# Patient Record
Sex: FEMALE | Race: WHITE | NOT HISPANIC OR LATINO | URBAN - METROPOLITAN AREA
[De-identification: names, ages, dates, MRNs, and addresses within clinical notes are randomized per-mention and may not be internally consistent; named-entity substitution may affect disease eponyms.]

---

## 2017-03-01 ENCOUNTER — APPOINTMENT (OUTPATIENT)
Dept: URBAN - METROPOLITAN AREA CLINIC 200 | Age: 47
Setting detail: DERMATOLOGY
End: 2017-03-03

## 2017-03-01 ENCOUNTER — RX ONLY (RX ONLY)
Age: 47
End: 2017-03-01

## 2017-03-01 DIAGNOSIS — L20.84 INTRINSIC (ALLERGIC) ECZEMA: ICD-10-CM

## 2017-03-01 PROBLEM — L70.0 ACNE VULGARIS: Status: ACTIVE | Noted: 2017-03-01

## 2017-03-01 PROBLEM — L30.9 DERMATITIS, UNSPECIFIED: Status: ACTIVE | Noted: 2017-03-01

## 2017-03-01 PROCEDURE — OTHER PRESCRIPTION: OTHER

## 2017-03-01 PROCEDURE — OTHER COUNSELING: OTHER

## 2017-03-01 PROCEDURE — OTHER PRESCRIPTION SAMPLES PROVIDED: OTHER

## 2017-03-01 PROCEDURE — 99212 OFFICE O/P EST SF 10 MIN: CPT

## 2017-03-01 RX ORDER — KETOCONAZOLE 20 MG/G
CREAM TOPICAL
Qty: 1 | Refills: 6 | Status: ERX

## 2017-03-01 ASSESSMENT — LOCATION DETAILED DESCRIPTION DERM
LOCATION DETAILED: RIGHT SUPERIOR CENTRAL MALAR CHEEK
LOCATION DETAILED: RIGHT CENTRAL MALAR CHEEK
LOCATION DETAILED: RIGHT MEDIAL MALAR CHEEK

## 2017-03-01 ASSESSMENT — LOCATION SIMPLE DESCRIPTION DERM: LOCATION SIMPLE: RIGHT CHEEK

## 2017-03-01 ASSESSMENT — LOCATION ZONE DERM: LOCATION ZONE: FACE

## 2017-03-03 ENCOUNTER — RX ONLY (RX ONLY)
Age: 47
End: 2017-03-03

## 2017-03-03 RX ORDER — FLUCONAZOLE 150 MG/1
TABLET ORAL
Qty: 2 | Refills: 0 | Status: ERX

## 2017-03-03 RX ORDER — HYDROCORTISONE 2.5 %
CREAM (GRAM) TOPICAL
Qty: 1 | Refills: 3 | Status: ERX

## 2017-03-24 ENCOUNTER — RX ONLY (RX ONLY)
Age: 47
End: 2017-03-24

## 2017-03-24 ENCOUNTER — APPOINTMENT (OUTPATIENT)
Dept: URBAN - METROPOLITAN AREA CLINIC 200 | Age: 47
Setting detail: DERMATOLOGY
End: 2017-03-30

## 2017-03-24 DIAGNOSIS — L20.84 INTRINSIC (ALLERGIC) ECZEMA: ICD-10-CM

## 2017-03-24 PROBLEM — L30.9 DERMATITIS, UNSPECIFIED: Status: ACTIVE | Noted: 2017-03-24

## 2017-03-24 PROCEDURE — 99213 OFFICE O/P EST LOW 20 MIN: CPT

## 2017-03-24 PROCEDURE — OTHER COUNSELING: OTHER

## 2017-03-24 PROCEDURE — OTHER PRESCRIPTION: OTHER

## 2017-03-24 RX ORDER — METHYLPREDNISOLONE 4 MG/1
TABLET ORAL
Qty: 1 | Refills: 0

## 2017-03-24 RX ORDER — DESONIDE 0.05% 0.03 G/60G
CREAM TOPICAL
Qty: 1 | Refills: 6

## 2017-03-24 ASSESSMENT — LOCATION SIMPLE DESCRIPTION DERM: LOCATION SIMPLE: RIGHT CHEEK

## 2017-03-24 ASSESSMENT — LOCATION ZONE DERM: LOCATION ZONE: FACE

## 2017-03-24 ASSESSMENT — LOCATION DETAILED DESCRIPTION DERM
LOCATION DETAILED: RIGHT CENTRAL MALAR CHEEK
LOCATION DETAILED: RIGHT SUPERIOR LATERAL MALAR CHEEK

## 2018-09-17 RX ORDER — ALPRAZOLAM 0.25 MG/1
0.25 TABLET ORAL 2 TIMES DAILY PRN
COMMUNITY
End: 2018-09-19 | Stop reason: SDUPTHER

## 2018-09-17 RX ORDER — THYROID 30 MG/1
30 TABLET ORAL DAILY
COMMUNITY
End: 2019-01-15 | Stop reason: ALTCHOICE

## 2018-09-18 ENCOUNTER — APPOINTMENT (OUTPATIENT)
Dept: URBAN - METROPOLITAN AREA CLINIC 200 | Age: 48
Setting detail: DERMATOLOGY
End: 2018-09-23

## 2018-09-18 DIAGNOSIS — L23.7 ALLERGIC CONTACT DERMATITIS DUE TO PLANTS, EXCEPT FOOD: ICD-10-CM

## 2018-09-18 PROCEDURE — OTHER COUNSELING: OTHER

## 2018-09-18 PROCEDURE — 99212 OFFICE O/P EST SF 10 MIN: CPT

## 2018-09-18 PROCEDURE — OTHER PRESCRIPTION: OTHER

## 2018-09-18 RX ORDER — BETAMETHASONE DIPROPIONATE 0.5 MG/G
CREAM TOPICAL
Qty: 1 | Refills: 1 | Status: ERX | COMMUNITY
Start: 2018-09-18

## 2018-09-18 ASSESSMENT — LOCATION SIMPLE DESCRIPTION DERM
LOCATION SIMPLE: RIGHT FOREARM
LOCATION SIMPLE: RIGHT POSTERIOR UPPER ARM

## 2018-09-18 ASSESSMENT — LOCATION ZONE DERM: LOCATION ZONE: ARM

## 2018-09-18 ASSESSMENT — LOCATION DETAILED DESCRIPTION DERM
LOCATION DETAILED: RIGHT VENTRAL MEDIAL DISTAL FOREARM
LOCATION DETAILED: RIGHT DISTAL POSTERIOR UPPER ARM
LOCATION DETAILED: RIGHT VENTRAL PROXIMAL FOREARM

## 2018-09-19 ENCOUNTER — OFFICE VISIT (OUTPATIENT)
Dept: PRIMARY CARE | Facility: CLINIC | Age: 48
End: 2018-09-19
Payer: COMMERCIAL

## 2018-09-19 ENCOUNTER — TELEPHONE (OUTPATIENT)
Dept: PRIMARY CARE | Facility: CLINIC | Age: 48
End: 2018-09-19

## 2018-09-19 VITALS
WEIGHT: 156 LBS | HEART RATE: 66 BPM | HEIGHT: 62 IN | DIASTOLIC BLOOD PRESSURE: 70 MMHG | BODY MASS INDEX: 28.71 KG/M2 | SYSTOLIC BLOOD PRESSURE: 110 MMHG

## 2018-09-19 DIAGNOSIS — L23.7 CONTACT DERMATITIS DUE TO POISON IVY: Primary | ICD-10-CM

## 2018-09-19 DIAGNOSIS — Z00.00 PREVENTATIVE HEALTH CARE: ICD-10-CM

## 2018-09-19 DIAGNOSIS — L23.7 POISON IVY: ICD-10-CM

## 2018-09-19 DIAGNOSIS — Z23 NEED FOR VACCINATION: ICD-10-CM

## 2018-09-19 DIAGNOSIS — F41.9 ANXIETY: ICD-10-CM

## 2018-09-19 PROCEDURE — 99214 OFFICE O/P EST MOD 30 MIN: CPT | Performed by: NURSE PRACTITIONER

## 2018-09-19 RX ORDER — ALPRAZOLAM 0.25 MG/1
0.25 TABLET ORAL 2 TIMES DAILY PRN
Qty: 30 TABLET | Refills: 0 | Status: SHIPPED | OUTPATIENT
Start: 2018-09-19 | End: 2018-12-12 | Stop reason: SDUPTHER

## 2018-09-19 RX ORDER — PREDNISONE 10 MG/1
TABLET ORAL
Qty: 20 TABLET | Refills: 0 | Status: SHIPPED | OUTPATIENT
Start: 2018-09-19 | End: 2019-01-15 | Stop reason: ALTCHOICE

## 2018-09-19 ASSESSMENT — ENCOUNTER SYMPTOMS
THOUGHT CONTENT - OBSESSIONS: 0
WHEEZING: 0
DEPRESSED MOOD: 0
COMPULSIONS: 0
CHEST TIGHTNESS: 0
NERVOUS/ANXIOUS: 1
INSOMNIA: 1
SHORTNESS OF BREATH: 0
PANIC: 0
RESTLESSNESS: 0
DECREASED CONCENTRATION: 0
PALPITATIONS: 0
IRRITABILITY: 0
CONSTITUTIONAL NEGATIVE: 1
CHOKING: 0
COUGH: 0

## 2018-09-19 NOTE — TELEPHONE ENCOUNTER
Pharmacy called and said they HAVE to have Prednisone taper instruction before they can fill script.  Please call [phone] with instructions

## 2018-09-19 NOTE — ASSESSMENT & PLAN NOTE
Stable on PRN Alprazolam. May continue to use sparingly PRN.  Full set of screening labs ordered at [facility].

## 2018-09-19 NOTE — ASSESSMENT & PLAN NOTE
Prednisone taper given 40mg to 10mg over 8 days.  May still use topical steroid if needed.  Cool short showers  Avoid scratching.  Loose clothing against skin.

## 2018-09-19 NOTE — PROGRESS NOTES
[facility]  Katie Fontana  [address], [address]  [place] [address]  Phone: [phone]  Fax: [phone]      Patient ID: Maryjo Tovar                              : 1970    Visit Date: 2018    Chief Complaint: Med Refill (Alprazolam  checked last filled 2018)         Patient ID: Maryjo Tovar is a 48 y.o. female.    Patient Active Problem List   Diagnosis   • Allergic rhinitis   • Anxiety   • Atopic dermatitis   • Family history of coronary artery disease   • Hemorrhoids, external   • Primary hypothyroidism   • Insomnia due to medical condition   • Polycystic ovarian disease   • Poison ivy         Current Outpatient Prescriptions:   •  ALPRAZolam (XANAX) 0.25 mg tablet, Take 1 tablet (0.25 mg total) by mouth 2 (two) times a day as needed for anxiety., Disp: 30 tablet, Rfl: 0  •  predniSONE (DELTASONE) 10 mg tablet, Take taper as directed with food. Written instruction given to pt in office., Disp: 20 tablet, Rfl: 0  •  thyroid, pork, 30 mg tablet, Take 30 mg by mouth daily., Disp: , Rfl:     Allergies   Allergen Reactions   • Sulfa (Sulfonamide Antibiotics) Hives       Social History     Social History   • Marital status:      Spouse name: N/A   • Number of children: N/A   • Years of education: N/A     Occupational History   • Not on file.     Social History Main Topics   • Smoking status: Never Smoker   • Smokeless tobacco: Never Used   • Alcohol use Not on file   • Drug use: Unknown   • Sexual activity: Not on file     Other Topics Concern   • Not on file     Social History Narrative   • No narrative on file       Health Maintenance   Topic Date Due   • DTaP, Tdap, and Td Vaccines (1 - Tdap) 1989   • Pap Smear  1991   • Influenza Vaccine (1) 2018   • Mammogram  11/15/2018   • HPV Vaccines  Aged Out   • Meningococcal Vaccine  Aged Out   • HIB Vaccines  Aged Out   • IPV Vaccines  Aged Out       HPI  Here for med  "refills. Xanax. Uses sparingly for acute symptoms. Mainly at night. Exercising regularly now. Feels good. Asking for routine labs.    Poison ivy. On topical steroids. Spreading. Asking for oral steroid taper that usually works for her.      Poison Ivy   This is a new problem. The current episode started 1 to 4 weeks ago. The problem has been gradually worsening since onset. The affected locations include the abdomen, right arm, torso and neck. The rash is characterized by blistering, itchiness, redness and swelling. She was exposed to plant contact. Pertinent negatives include no cough or shortness of breath. Past treatments include topical steroids. The treatment provided no relief.   Anxiety   Presents for follow-up visit. Symptoms include excessive worry, insomnia and nervous/anxious behavior. Patient reports no chest pain, compulsions, decreased concentration, depressed mood, irritability, obsessions, palpitations, panic, restlessness, shortness of breath or suicidal ideas. Symptoms occur occasionally. The severity of symptoms is mild. The patient sleeps 6 hours per night. The quality of sleep is fair.     Compliance with medications is %.       The following have been reviewed and updated as appropriate in this visit:         Review of System  Review of Systems   Constitutional: Negative.  Negative for irritability.   Respiratory: Negative for cough, choking, chest tightness, shortness of breath and wheezing.    Cardiovascular: Negative for chest pain, palpitations and leg swelling.   Psychiatric/Behavioral: Negative for decreased concentration and suicidal ideas. The patient is nervous/anxious and has insomnia.        Objective     Vitals  Vitals:    09/19/18 1029   BP: 110/70   Pulse: 66   Weight: 70.8 kg (156 lb)   Height: 1.575 m (5' 2\")     Body mass index is 28.53 kg/m².    Physical Exam  Physical Exam   Constitutional: She is oriented to person, place, and time. She appears well-developed and " well-nourished. No distress.   Cardiovascular: Normal rate, regular rhythm and normal heart sounds.    No murmur heard.  Pulmonary/Chest: Effort normal and breath sounds normal. No respiratory distress. She has no wheezes.   Neurological: She is alert and oriented to person, place, and time.   Skin: Rash noted. She is not diaphoretic.   Papular erythematous rash noted on R arm scattered R torso, abd and neck   Psychiatric: She has a normal mood and affect. Her behavior is normal. Judgment and thought content normal. She expresses no suicidal ideation. She expresses no suicidal plans.   Vitals reviewed.      Assessment/Plan     Problem List Items Addressed This Visit     Anxiety     Stable on PRN Alprazolam. May continue to use sparingly PRN.  Full set of screening labs ordered at [facility].         Poison ivy     Prednisone taper given 40mg to 10mg over 8 days.  May still use topical steroid if needed.  Cool short showers  Avoid scratching.  Loose clothing against skin.           Other Visit Diagnoses     Contact dermatitis due to poison ivy    -  Primary    Relevant Medications    predniSONE (DELTASONE) 10 mg tablet    Need for vaccination        Preventative health care        Relevant Orders    CBC and Differential    Comprehensive metabolic panel    Lipid panel    TSH 3rd Generation    Urinalysis with Reflex Culture              LUIS CARLOS Staley  9/19/2018

## 2018-09-25 ENCOUNTER — TELEPHONE (OUTPATIENT)
Dept: PRIMARY CARE | Facility: CLINIC | Age: 48
End: 2018-09-25

## 2018-09-25 DIAGNOSIS — R31.29 HEMATURIA, MICROSCOPIC: Primary | ICD-10-CM

## 2018-09-25 LAB
ALBUMIN SERPL-MCNC: 4.1 G/DL (ref 3.6–5.1)
ALBUMIN/GLOB SERPL: 1.6 (CALC) (ref 1–2.5)
ALP SERPL-CCNC: 43 U/L (ref 33–115)
ALT SERPL-CCNC: 12 U/L (ref 6–29)
APPEARANCE UR: CLEAR
AST SERPL-CCNC: 11 U/L (ref 10–35)
BACTERIA #/AREA URNS HPF: (no result) /HPF
BASOPHILS # BLD AUTO: 43 CELLS/UL (ref 0–200)
BASOPHILS NFR BLD AUTO: 0.6 %
BILIRUB SERPL-MCNC: 0.6 MG/DL (ref 0.2–1.2)
BILIRUB UR QL STRIP: NEGATIVE
BUN SERPL-MCNC: 13 MG/DL (ref 7–25)
BUN/CREAT SERPL: NORMAL (CALC) (ref 6–22)
CALCIUM SERPL-MCNC: 9.4 MG/DL (ref 8.6–10.2)
CHLORIDE SERPL-SCNC: 103 MMOL/L (ref 98–110)
CHOLEST SERPL-MCNC: 208 MG/DL
CHOLEST/HDLC SERPL: 2.3 (CALC)
CO2 SERPL-SCNC: 29 MMOL/L (ref 20–32)
COLOR UR: YELLOW
CREAT SERPL-MCNC: 0.86 MG/DL (ref 0.5–1.1)
EOSINOPHIL # BLD AUTO: 213 CELLS/UL (ref 15–500)
EOSINOPHIL NFR BLD AUTO: 3 %
ERYTHROCYTE [DISTWIDTH] IN BLOOD BY AUTOMATED COUNT: 11.7 % (ref 11–15)
GFR SERPL CREATININE-BSD FRML MDRD: 80 ML/MIN/1.73M2
GLOBULIN SER CALC-MCNC: 2.5 G/DL (CALC) (ref 1.9–3.7)
GLUCOSE SERPL-MCNC: 81 MG/DL (ref 65–99)
GLUCOSE UR QL STRIP: NEGATIVE
HCT VFR BLD AUTO: 39.2 % (ref 35–45)
HDLC SERPL-MCNC: 89 MG/DL
HGB BLD-MCNC: 13.2 G/DL (ref 11.7–15.5)
HGB UR QL STRIP: (no result)
HYALINE CASTS #/AREA URNS LPF: (no result) /LPF
KETONES UR QL STRIP: NEGATIVE
LDLC SERPL CALC-MCNC: 103 MG/DL (CALC)
LEUKOCYTE ESTERASE UR QL STRIP: NEGATIVE
LYMPHOCYTES # BLD AUTO: 2932 CELLS/UL (ref 850–3900)
LYMPHOCYTES NFR BLD AUTO: 41.3 %
MCH RBC QN AUTO: 32.8 PG (ref 27–33)
MCHC RBC AUTO-ENTMCNC: 33.7 G/DL (ref 32–36)
MCV RBC AUTO: 97.5 FL (ref 80–100)
MONOCYTES # BLD AUTO: 582 CELLS/UL (ref 200–950)
MONOCYTES NFR BLD AUTO: 8.2 %
NEUTROPHILS # BLD AUTO: 3330 CELLS/UL (ref 1500–7800)
NEUTROPHILS NFR BLD AUTO: 46.9 %
NITRITE UR QL STRIP: NEGATIVE
NONHDLC SERPL-MCNC: 119 MG/DL (CALC)
PH UR STRIP: 6.5 [PH] (ref 5–8)
PLATELET # BLD AUTO: 194 THOUSAND/UL (ref 140–400)
PMV BLD REES-ECKER: 10.8 FL (ref 7.5–12.5)
POTASSIUM SERPL-SCNC: 3.7 MMOL/L (ref 3.5–5.3)
PROT SERPL-MCNC: 6.6 G/DL (ref 6.1–8.1)
PROT UR QL STRIP: NEGATIVE
RBC # BLD AUTO: 4.02 MILLION/UL (ref 3.8–5.1)
RBC #/AREA URNS HPF: (no result) /HPF
SODIUM SERPL-SCNC: 140 MMOL/L (ref 135–146)
SP GR UR STRIP: 1.02 (ref 1–1.03)
SQUAMOUS #/AREA URNS HPF: (no result) /HPF
T3FREE SERPL-MCNC: 2.5 PG/ML (ref 2.3–4.2)
TRIGL SERPL-MCNC: 74 MG/DL
TSH SERPL-ACNC: 2.55 MIU/L
WBC # BLD AUTO: 7.1 THOUSAND/UL (ref 3.8–10.8)
WBC #/AREA URNS HPF: (no result) /HPF

## 2018-09-25 NOTE — TELEPHONE ENCOUNTER
----- Message from LUIS CARLOS Staley sent at 9/25/2018  6:59 AM EDT -----  There is blood in urine. Please call pt and see if near her menses. Repeat if not.

## 2018-10-01 RX ORDER — METHYLPREDNISOLONE 4 MG/1
TABLET ORAL
Qty: 1 | Refills: 0 | Status: ERX

## 2018-10-01 RX ORDER — BETAMETHASONE DIPROPIONATE 0.5 MG/G
CREAM TOPICAL
Qty: 1 | Refills: 1 | Status: ERX

## 2018-10-01 RX ORDER — METHYLPREDNISOLONE 4 MG/1
TABLET ORAL
Qty: 1 | Refills: 0 | Status: CANCELLED

## 2018-10-02 LAB
APPEARANCE UR: CLEAR
BACTERIA #/AREA URNS HPF: (no result) /HPF
BACTERIA UR CULT: NORMAL
BILIRUB UR QL STRIP: NEGATIVE
COLOR UR: YELLOW
GLUCOSE UR QL STRIP: NEGATIVE
HGB UR QL STRIP: NEGATIVE
HYALINE CASTS #/AREA URNS LPF: (no result) /LPF
KETONES UR QL STRIP: NEGATIVE
LEUKOCYTE ESTERASE UR QL STRIP: NEGATIVE
NITRITE UR QL STRIP: NEGATIVE
PH UR STRIP: 7.5 [PH] (ref 5–8)
PROT UR QL STRIP: NEGATIVE
RBC #/AREA URNS HPF: (no result) /HPF
SP GR UR STRIP: 1.01 (ref 1–1.03)
SQUAMOUS #/AREA URNS HPF: (no result) /HPF
WBC #/AREA URNS HPF: (no result) /HPF

## 2018-12-12 ENCOUNTER — TELEPHONE (OUTPATIENT)
Dept: PRIMARY CARE | Facility: CLINIC | Age: 48
End: 2018-12-12

## 2018-12-12 RX ORDER — ALPRAZOLAM 0.25 MG/1
0.25 TABLET ORAL 2 TIMES DAILY PRN
Qty: 30 TABLET | Refills: 0 | Status: SHIPPED | OUTPATIENT
Start: 2018-12-12 | End: 2019-01-18 | Stop reason: SDUPTHER

## 2019-01-15 ENCOUNTER — OFFICE VISIT (OUTPATIENT)
Dept: PRIMARY CARE | Facility: CLINIC | Age: 49
End: 2019-01-15
Payer: COMMERCIAL

## 2019-01-15 VITALS
DIASTOLIC BLOOD PRESSURE: 80 MMHG | HEIGHT: 62 IN | BODY MASS INDEX: 28.16 KG/M2 | WEIGHT: 153 LBS | SYSTOLIC BLOOD PRESSURE: 130 MMHG | HEART RATE: 70 BPM

## 2019-01-15 DIAGNOSIS — Z11.1 SCREENING EXAMINATION FOR PULMONARY TUBERCULOSIS: Primary | ICD-10-CM

## 2019-01-15 DIAGNOSIS — Z02.1 PHYSICAL EXAM, PRE-EMPLOYMENT: ICD-10-CM

## 2019-01-15 PROCEDURE — 86580 TB INTRADERMAL TEST: CPT | Performed by: INTERNAL MEDICINE

## 2019-01-15 PROCEDURE — 99396 PREV VISIT EST AGE 40-64: CPT | Performed by: NURSE PRACTITIONER

## 2019-01-15 RX ORDER — THYROID, PORCINE 90 MG/1
1 TABLET ORAL
Refills: 11 | COMMUNITY
Start: 2018-11-02 | End: 2020-10-22 | Stop reason: ALTCHOICE

## 2019-01-15 ASSESSMENT — ENCOUNTER SYMPTOMS
ENDOCRINE NEGATIVE: 1
NEUROLOGICAL NEGATIVE: 1
PSYCHIATRIC NEGATIVE: 1
CONSTITUTIONAL NEGATIVE: 1
ALLERGIC/IMMUNOLOGIC NEGATIVE: 1
CARDIOVASCULAR NEGATIVE: 1
HEMATOLOGIC/LYMPHATIC NEGATIVE: 1
EYES NEGATIVE: 1
MUSCULOSKELETAL NEGATIVE: 1
RESPIRATORY NEGATIVE: 1
GASTROINTESTINAL NEGATIVE: 1

## 2019-01-15 NOTE — PROGRESS NOTES
[facility]  Katie Fontana  [address], [address]  [place] [address]  Phone: [phone]  Fax: [phone]      Patient ID: Maryjo Tovar                              : 1970    Visit Date: 1/15/2019    Chief Complaint: Annual Exam         Patient ID: Maryjo Tovar is a 48 y.o. female.    Patient Active Problem List   Diagnosis   • Allergic rhinitis   • Anxiety   • Atopic dermatitis   • Family history of coronary artery disease   • Hemorrhoids, external   • Primary hypothyroidism   • Insomnia due to medical condition   • Polycystic ovarian disease   • Poison ivy   • Physical exam, pre-employment         Current Outpatient Prescriptions:   •  ALPRAZolam (XANAX) 0.25 mg tablet, Take 1 tablet (0.25 mg total) by mouth 2 (two) times a day as needed for anxiety., Disp: 30 tablet, Rfl: 0  •  ARMOUR THYROID 90 mg tablet, Take 1 tablet by mouth once daily., Disp: , Rfl: 11    Allergies   Allergen Reactions   • Sulfa (Sulfonamide Antibiotics) Hives       Social History     Social History   • Marital status:      Spouse name: N/A   • Number of children: N/A   • Years of education: N/A     Occupational History   • Not on file.     Social History Main Topics   • Smoking status: Never Smoker   • Smokeless tobacco: Never Used   • Alcohol use Not on file   • Drug use: Unknown   • Sexual activity: Not on file     Other Topics Concern   • Not on file     Social History Narrative   • No narrative on file       Health Maintenance   Topic Date Due   • DTaP, Tdap, and Td Vaccines (1 - Tdap) 1989   • Pap Smear  1991   • Influenza Vaccine (1) 2018   • Mammogram  11/15/2018   • HPV Vaccines  Aged Out   • Meningococcal Vaccine  Aged Out   • HIB Vaccines  Aged Out   • IPV Vaccines  Aged Out     Family History   Problem Relation Age of Onset   • Hypertension Mother    • Hypertension Father    • Hyperlipidemia Father      No past surgical history on  "file.    HPI  Work PE  New job.   in [place]  .  Needs PPD today.        The following have been reviewed and updated as appropriate in this visit:  Allergies  Meds  Problems         Review of System  Review of Systems   Constitutional: Negative.    HENT: Negative.    Eyes: Negative.    Respiratory: Negative.    Cardiovascular: Negative.    Gastrointestinal: Negative.    Endocrine: Negative.    Genitourinary: Negative.    Musculoskeletal: Negative.    Skin: Negative.    Allergic/Immunologic: Negative.    Neurological: Negative.    Hematological: Negative.    Psychiatric/Behavioral: Negative.        Objective     Vitals  Vitals:    01/15/19 0857   BP: 130/80   Pulse: 70   Weight: 69.4 kg (153 lb)   Height: 1.575 m (5' 2\")     Body mass index is 27.98 kg/m².    Physical Exam  Physical Exam   Constitutional: She is oriented to person, place, and time. She appears well-developed and well-nourished. No distress.   HENT:   Head: Normocephalic.   Right Ear: Tympanic membrane, external ear and ear canal normal.   Left Ear: Tympanic membrane, external ear and ear canal normal.   Nose: Nose normal.   Mouth/Throat: Oropharynx is clear and moist and mucous membranes are normal.   Eyes: Conjunctivae and EOM are normal. Pupils are equal, round, and reactive to light. Right eye exhibits no discharge. Left eye exhibits no discharge.   Neck: Neck supple. No JVD present. No thyromegaly present.   Cardiovascular: Normal rate, regular rhythm, normal heart sounds and intact distal pulses.  Exam reveals no gallop and no friction rub.    No murmur heard.  Pulmonary/Chest: Effort normal and breath sounds normal. No respiratory distress. She has no wheezes. She has no rales.   Abdominal: Soft. Bowel sounds are normal. She exhibits no distension and no mass. There is no tenderness.   Musculoskeletal: Normal range of motion. She exhibits no edema, tenderness or deformity.   Lymphadenopathy:     She " has no cervical adenopathy.   Neurological: She is alert and oriented to person, place, and time. She displays normal reflexes. No cranial nerve deficit or sensory deficit.   Skin: Skin is warm and dry. No rash noted. She is not diaphoretic. No erythema. No pallor.   Vitals reviewed.      Assessment/Plan     Problem List Items Addressed This Visit     Physical exam, pre-employment     Forms completed.  PPD placed PPD read 48 hours  Flu shot recommended--pt will think about it.  Recent full eye exam-- needs readers but distance was normal.           Other Visit Diagnoses     Screening examination for pulmonary tuberculosis    -  Primary    Relevant Orders    TB Skin Test (Completed)              LUIS CARLOS Staley  1/15/2019

## 2019-01-15 NOTE — ASSESSMENT & PLAN NOTE
Forms completed.  PPD placed PPD read 48 hours  Flu shot recommended--pt will think about it.  Recent full eye exam-- needs readers but distance was normal.

## 2019-01-17 ENCOUNTER — CLINICAL SUPPORT (OUTPATIENT)
Dept: PRIMARY CARE | Facility: CLINIC | Age: 49
End: 2019-01-17
Payer: COMMERCIAL

## 2019-01-17 DIAGNOSIS — Z23 NEED FOR VACCINATION: Primary | ICD-10-CM

## 2019-01-17 LAB
INDURATION: 0 MM
TB SKIN TEST: NEGATIVE

## 2019-01-17 PROCEDURE — 90686 IIV4 VACC NO PRSV 0.5 ML IM: CPT | Performed by: NURSE PRACTITIONER

## 2019-01-17 PROCEDURE — 90471 IMMUNIZATION ADMIN: CPT | Performed by: NURSE PRACTITIONER

## 2019-01-18 RX ORDER — ALPRAZOLAM 0.25 MG/1
0.25 TABLET ORAL 2 TIMES DAILY PRN
Qty: 30 TABLET | Refills: 0 | Status: SHIPPED | OUTPATIENT
Start: 2019-01-18 | End: 2019-06-03 | Stop reason: SDUPTHER

## 2019-01-18 RX ORDER — AZITHROMYCIN 250 MG/1
TABLET, FILM COATED ORAL
Qty: 6 TABLET | Refills: 0 | Status: SHIPPED | OUTPATIENT
Start: 2019-01-18 | End: 2020-01-15 | Stop reason: ALTCHOICE

## 2019-06-03 ENCOUNTER — TELEPHONE (OUTPATIENT)
Dept: PRIMARY CARE | Facility: CLINIC | Age: 49
End: 2019-06-03

## 2019-06-03 NOTE — TELEPHONE ENCOUNTER
Medicine Refill Request    Last Office Visit: 1/15/2019  Next Office Visit: Visit date not found        Current Outpatient Prescriptions:   •  ALPRAZolam (XANAX) 0.25 mg tablet, Take 1 tablet (0.25 mg total) by mouth 2 (two) times a day as needed for anxiety., Disp: 30 tablet, Rfl: 0  •  ARMOUR THYROID 90 mg tablet, Take 1 tablet by mouth once daily., Disp: , Rfl: 11      BP Readings from Last 3 Encounters:   01/15/19 130/80   09/19/18 110/70       Recent Lab results:  Lab Results   Component Value Date    CHOL 208 (H) 09/24/2018   ,   Lab Results   Component Value Date    HDL 89 09/24/2018   ,   Lab Results   Component Value Date    LDLCALC 103 (H) 09/24/2018   ,   Lab Results   Component Value Date    TRIG 74 09/24/2018        Lab Results   Component Value Date    GLUCOSE NEGATIVE 10/01/2018   , No results found for: HGBA1C      Lab Results   Component Value Date    CREATININE 0.86 09/24/2018       Lab Results   Component Value Date    TSH 2.55 09/24/2018      Refill Xanax .25mg to [facility]

## 2019-06-03 NOTE — TELEPHONE ENCOUNTER
Medicine Refill Request    Last Office Visit: 1/15/2019  Next Office Visit: Visit date not found        Current Outpatient Prescriptions:   •  ALPRAZolam (XANAX) 0.25 mg tablet, Take 1 tablet (0.25 mg total) by mouth 2 (two) times a day as needed for anxiety., Disp: 30 tablet, Rfl: 0  •  ARMOUR THYROID 90 mg tablet, Take 1 tablet by mouth once daily., Disp: , Rfl: 11      BP Readings from Last 3 Encounters:   01/15/19 130/80   09/19/18 110/70       Recent Lab results:  Lab Results   Component Value Date    CHOL 208 (H) 09/24/2018   ,   Lab Results   Component Value Date    HDL 89 09/24/2018   ,   Lab Results   Component Value Date    LDLCALC 103 (H) 09/24/2018   ,   Lab Results   Component Value Date    TRIG 74 09/24/2018        Lab Results   Component Value Date    GLUCOSE NEGATIVE 10/01/2018   , No results found for: HGBA1C      Lab Results   Component Value Date    CREATININE 0.86 09/24/2018       Lab Results   Component Value Date    TSH 2.55 09/24/2018

## 2019-06-04 RX ORDER — ALPRAZOLAM 0.25 MG/1
0.25 TABLET ORAL 2 TIMES DAILY PRN
Qty: 30 TABLET | Refills: 0 | Status: SHIPPED | OUTPATIENT
Start: 2019-06-04 | End: 2019-09-13 | Stop reason: SDUPTHER

## 2019-09-13 ENCOUNTER — TELEPHONE (OUTPATIENT)
Dept: PRIMARY CARE | Facility: CLINIC | Age: 49
End: 2019-09-13

## 2019-09-13 RX ORDER — ALPRAZOLAM 0.25 MG/1
0.25 TABLET ORAL 2 TIMES DAILY PRN
Qty: 30 TABLET | Refills: 0 | Status: SHIPPED | OUTPATIENT
Start: 2019-09-13 | End: 2019-12-03 | Stop reason: SDUPTHER

## 2019-09-13 NOTE — TELEPHONE ENCOUNTER
Please Medicine Refill Request    Last Office Visit: 1/15/2019  Next Office Visit: Visit date not found  Please call in Alprazolam    0.25 mg    [facility]      Current Outpatient Prescriptions:   •  ALPRAZolam (XANAX) 0.25 mg tablet, Take 1 tablet (0.25 mg total) by mouth 2 (two) times a day as needed for anxiety., Disp: 30 tablet, Rfl: 0  •  ARMOUR THYROID 90 mg tablet, Take 1 tablet by mouth once daily., Disp: , Rfl: 11      BP Readings from Last 3 Encounters:   01/15/19 130/80   09/19/18 110/70       Recent Lab results:  Lab Results   Component Value Date    CHOL 208 (H) 09/24/2018   ,   Lab Results   Component Value Date    HDL 89 09/24/2018   ,   Lab Results   Component Value Date    LDLCALC 103 (H) 09/24/2018   ,   Lab Results   Component Value Date    TRIG 74 09/24/2018        Lab Results   Component Value Date    GLUCOSE NEGATIVE 10/01/2018   , No results found for: HGBA1C      Lab Results   Component Value Date    CREATININE 0.86 09/24/2018       Lab Results   Component Value Date    TSH 2.55 09/24/2018

## 2019-12-03 RX ORDER — ALPRAZOLAM 0.25 MG/1
0.25 TABLET ORAL 2 TIMES DAILY PRN
Qty: 30 TABLET | Refills: 0 | Status: SHIPPED | OUTPATIENT
Start: 2019-12-03 | End: 2020-02-27 | Stop reason: SDUPTHER

## 2019-12-03 NOTE — TELEPHONE ENCOUNTER
Medicine Refill Request    Last Office Visit: 1/15/2019  Next Office Visit: Visit date not found        Current Outpatient Medications:   •  ALPRAZolam (XANAX) 0.25 mg tablet, Take 1 tablet (0.25 mg total) by mouth 2 (two) times a day as needed for anxiety., Disp: 30 tablet, Rfl: 0  •  ARMOUR THYROID 90 mg tablet, Take 1 tablet by mouth once daily., Disp: , Rfl: 11      BP Readings from Last 3 Encounters:   01/15/19 130/80   09/19/18 110/70       Recent Lab results:  Lab Results   Component Value Date    CHOL 208 (H) 09/24/2018   ,   Lab Results   Component Value Date    HDL 89 09/24/2018   ,   Lab Results   Component Value Date    LDLCALC 103 (H) 09/24/2018   ,   Lab Results   Component Value Date    TRIG 74 09/24/2018        Lab Results   Component Value Date    GLUCOSE NEGATIVE 10/01/2018   , No results found for: HGBA1C      Lab Results   Component Value Date    CREATININE 0.86 09/24/2018       Lab Results   Component Value Date    TSH 2.55 09/24/2018         Medicine Refill Request    Last Office Visit: 1/15/2019  Next Office Visit: Visit date not found   check OK-last filled 9/2019      Current Outpatient Medications:   •  ALPRAZolam (XANAX) 0.25 mg tablet, Take 1 tablet (0.25 mg total) by mouth 2 (two) times a day as needed for anxiety., Disp: 30 tablet, Rfl: 0  •  ARMOUR THYROID 90 mg tablet, Take 1 tablet by mouth once daily., Disp: , Rfl: 11      BP Readings from Last 3 Encounters:   01/15/19 130/80   09/19/18 110/70       Recent Lab results:  Lab Results   Component Value Date    CHOL 208 (H) 09/24/2018   ,   Lab Results   Component Value Date    HDL 89 09/24/2018   ,   Lab Results   Component Value Date    LDLCALC 103 (H) 09/24/2018   ,   Lab Results   Component Value Date    TRIG 74 09/24/2018        Lab Results   Component Value Date    GLUCOSE NEGATIVE 10/01/2018   , No results found for: HGBA1C      Lab Results   Component Value Date    CREATININE 0.86 09/24/2018       Lab Results   Component  Value Date    TSH 2.55 09/24/2018

## 2020-01-15 ENCOUNTER — OFFICE VISIT (OUTPATIENT)
Dept: PRIMARY CARE | Facility: CLINIC | Age: 50
End: 2020-01-15
Payer: COMMERCIAL

## 2020-01-15 VITALS
SYSTOLIC BLOOD PRESSURE: 120 MMHG | HEART RATE: 66 BPM | WEIGHT: 158 LBS | BODY MASS INDEX: 29.08 KG/M2 | HEIGHT: 62 IN | DIASTOLIC BLOOD PRESSURE: 80 MMHG

## 2020-01-15 DIAGNOSIS — Z12.39 SCREENING BREAST EXAMINATION: ICD-10-CM

## 2020-01-15 DIAGNOSIS — J01.80 ACUTE NON-RECURRENT SINUSITIS OF OTHER SINUS: Primary | ICD-10-CM

## 2020-01-15 PROBLEM — L23.7 POISON IVY: Status: RESOLVED | Noted: 2018-09-19 | Resolved: 2020-01-15

## 2020-01-15 PROBLEM — Z02.1 PHYSICAL EXAM, PRE-EMPLOYMENT: Status: RESOLVED | Noted: 2019-01-15 | Resolved: 2020-01-15

## 2020-01-15 PROBLEM — J01.90 ACUTE INFECTION OF NASAL SINUS: Status: ACTIVE | Noted: 2020-01-15

## 2020-01-15 PROCEDURE — 99214 OFFICE O/P EST MOD 30 MIN: CPT | Performed by: NURSE PRACTITIONER

## 2020-01-15 RX ORDER — AZITHROMYCIN 250 MG/1
TABLET, FILM COATED ORAL
Qty: 6 TABLET | Refills: 0 | Status: SHIPPED | OUTPATIENT
Start: 2020-01-15 | End: 2020-01-20

## 2020-01-15 RX ORDER — PROMETHAZINE HYDROCHLORIDE AND CODEINE PHOSPHATE 6.25; 1 MG/5ML; MG/5ML
5 SOLUTION ORAL NIGHTLY PRN
Qty: 118 ML | Refills: 0 | Status: SHIPPED | OUTPATIENT
Start: 2020-01-15 | End: 2020-01-25

## 2020-01-15 ASSESSMENT — ENCOUNTER SYMPTOMS
SINUS PRESSURE: 1
HEADACHES: 1
SORE THROAT: 1
NECK PAIN: 0
HOARSE VOICE: 0
SHORTNESS OF BREATH: 0
DIAPHORESIS: 0
SWOLLEN GLANDS: 0
COUGH: 1
CHILLS: 0

## 2020-01-15 NOTE — ASSESSMENT & PLAN NOTE
Zpack as directed #1  Push po fluids  REST  Steam sinuses in shower daily.  Phen with codeine Prn at night   Follow up if symptoms worsen/persist.

## 2020-01-15 NOTE — PROGRESS NOTES
[facility]  Katie Fontana  [address], [address]  [place] [address]  Phone: [phone]  Fax: [phone]      Patient ID: Maryjo Tovar                              : 1970    Visit Date: 1/15/2020    Chief Complaint: Sinusitis         Patient ID: Maryjo Tovar is a 49 y.o. female.    Patient Active Problem List   Diagnosis   • Allergic rhinitis   • Anxiety   • Atopic dermatitis   • Family history of coronary artery disease   • Hemorrhoids, external   • Primary hypothyroidism   • Insomnia due to medical condition   • Polycystic ovarian disease   • Acute infection of nasal sinus         Current Outpatient Medications:   •  ALPRAZolam (XANAX) 0.25 mg tablet, Take 1 tablet (0.25 mg total) by mouth 2 (two) times a day as needed for anxiety., Disp: 30 tablet, Rfl: 0  •  ARMOUR THYROID 90 mg tablet, Take 1 tablet by mouth once daily., Disp: , Rfl: 11  •  azithromycin (ZITHROMAX) 250 mg tablet, Take 2 tablets the first day, then 1 tablet daily for 4 days., Disp: 6 tablet, Rfl: 0  •  promethazine-codeine (PHENERGAN with CODEINE) 6.25-10 mg/5 mL syrup, Take 5 mL by mouth nightly as needed for cough for up to 10 days., Disp: 118 mL, Rfl: 0    Allergies   Allergen Reactions   • Sulfa (Sulfonamide Antibiotics) Hives       Social History     Tobacco Use   • Smoking status: Never Smoker   • Smokeless tobacco: Never Used   Substance Use Topics   • Alcohol use: Not on file   • Drug use: Not on file       Health Maintenance   Topic Date Due   • Varicella Vaccines (1 of 2 - 13+ 2-dose series) 1983   • HIV Screening  1983   • DTaP, Tdap, and Td Vaccines (1 - Tdap) 1989   • Cervical Cancer Screening  1991   • Mammogram  11/15/2018   • Influenza Vaccine (1) 2019   • Meningococcal ACWY  Aged Out   • HIB Vaccines  Aged Out   • IPV Vaccines  Aged Out   • HPV Vaccines  Aged Out   • Pneumococcal  Aged Out       HPI  Possible sinus infecton    Sinusitis  "  This is a new problem. The current episode started in the past 7 days. The problem has been gradually worsening since onset. There has been no fever. The pain is moderate. Associated symptoms include congestion, coughing, ear pain, headaches, sinus pressure and a sore throat. Pertinent negatives include no chills, diaphoresis, hoarse voice, neck pain, shortness of breath, sneezing or swollen glands. Past treatments include oral decongestants and acetaminophen (lozenges, gargles). The treatment provided mild relief.       The following have been reviewed and updated as appropriate in this visit:  Allergies  Meds  Problems         Review of System  Review of Systems   Constitutional: Negative for chills and diaphoresis.   HENT: Positive for congestion, ear pain, sinus pressure and sore throat. Negative for hoarse voice and sneezing.    Respiratory: Positive for cough. Negative for shortness of breath.    Musculoskeletal: Negative for neck pain.   Neurological: Positive for headaches.       Objective     Vitals  Vitals:    01/15/20 1343   BP: 120/80   BP Location: Left upper arm   Patient Position: Sitting   Pulse: 66   Weight: 71.7 kg (158 lb)   Height: 1.575 m (5' 2\")     Body mass index is 28.9 kg/m².    Physical Exam  Physical Exam   Constitutional: She is oriented to person, place, and time. She appears well-developed and well-nourished. No distress.   HENT:   Right Ear: External ear and ear canal normal. Tympanic membrane is bulging.   Left Ear: External ear and ear canal normal. Tympanic membrane is bulging.   Nose: Mucosal edema present. No rhinorrhea.   Mouth/Throat: Posterior oropharyngeal edema and posterior oropharyngeal erythema present. No oropharyngeal exudate or tonsillar abscesses.   Neck: Neck supple. No JVD present. No thyromegaly present.   Cardiovascular: Normal rate, regular rhythm and normal heart sounds. Exam reveals no gallop and no friction rub.   No murmur heard.  Pulmonary/Chest: Effort " normal and breath sounds normal. No respiratory distress. She has no wheezes. She has no rales.   Lymphadenopathy:     She has no cervical adenopathy.   Neurological: She is alert and oriented to person, place, and time.   Skin: She is not diaphoretic.   Vitals reviewed.      Assessment/Plan     Problem List Items Addressed This Visit     Acute infection of nasal sinus - Primary     Zpack as directed #1  Push po fluids  REST  Steam sinuses in shower daily.  Phen with codeine Prn at night   Follow up if symptoms worsen/persist.         Relevant Medications    azithromycin (ZITHROMAX) 250 mg tablet    promethazine-codeine (PHENERGAN with CODEINE) 6.25-10 mg/5 mL syrup      Other Visit Diagnoses     Screening breast examination        Relevant Orders    BI SCREENING MAMMOGRAM BILATERAL              LUIS CARLOS Staley  1/15/2020

## 2020-01-28 DIAGNOSIS — Z12.39 SCREENING BREAST EXAMINATION: ICD-10-CM

## 2020-02-27 DIAGNOSIS — F41.9 ANXIETY: Primary | ICD-10-CM

## 2020-02-27 NOTE — TELEPHONE ENCOUNTER
Medicine Refill Request    Last Office Visit: 1/15/2020  Next Office Visit: Visit date not found    : Last filled 12/3/19 #30    Current Outpatient Medications:   •  ALPRAZolam (XANAX) 0.25 mg tablet, Take 1 tablet (0.25 mg total) by mouth 2 (two) times a day as needed for anxiety., Disp: 30 tablet, Rfl: 0  •  ARMOUR THYROID 90 mg tablet, Take 1 tablet by mouth once daily., Disp: , Rfl: 11      BP Readings from Last 3 Encounters:   01/15/20 120/80   01/15/19 130/80   09/19/18 110/70       Recent Lab results:  Lab Results   Component Value Date    CHOL 208 (H) 09/24/2018   ,   Lab Results   Component Value Date    HDL 89 09/24/2018   ,   Lab Results   Component Value Date    LDLCALC 103 (H) 09/24/2018   ,   Lab Results   Component Value Date    TRIG 74 09/24/2018        Lab Results   Component Value Date    GLUCOSE NEGATIVE 10/01/2018   , No results found for: HGBA1C      Lab Results   Component Value Date    CREATININE 0.86 09/24/2018       Lab Results   Component Value Date    TSH 2.55 09/24/2018

## 2020-02-28 RX ORDER — ALPRAZOLAM 0.25 MG/1
0.25 TABLET ORAL 2 TIMES DAILY PRN
Qty: 30 TABLET | Refills: 0 | Status: SHIPPED | OUTPATIENT
Start: 2020-02-28 | End: 2020-06-08 | Stop reason: SDUPTHER

## 2020-06-04 ENCOUNTER — APPOINTMENT (OUTPATIENT)
Dept: LAB | Age: 50
End: 2020-06-04
Attending: INTERNAL MEDICINE
Payer: COMMERCIAL

## 2020-06-04 ENCOUNTER — TRANSCRIBE ORDERS (OUTPATIENT)
Dept: CASE MANAGEMENT | Facility: CLINIC | Age: 50
End: 2020-06-04

## 2020-06-04 DIAGNOSIS — E03.9 HYPOTHYROIDISM, UNSPECIFIED: Primary | ICD-10-CM

## 2020-06-04 DIAGNOSIS — R53.83 OTHER FATIGUE: ICD-10-CM

## 2020-06-04 DIAGNOSIS — E03.9 HYPOTHYROIDISM, UNSPECIFIED: ICD-10-CM

## 2020-06-04 LAB
25(OH)D3 SERPL-MCNC: 22 NG/ML (ref 30–100)
ERYTHROCYTE [DISTWIDTH] IN BLOOD BY AUTOMATED COUNT: 12 % (ref 11.7–14.4)
FERRITIN SERPL-MCNC: 18 NG/ML (ref 11–250)
HCT VFR BLDCO AUTO: 39.8 % (ref 35–45)
HGB BLD-MCNC: 13.1 G/DL (ref 11.8–15.7)
MCH RBC QN AUTO: 32.3 PG (ref 28–33.2)
MCHC RBC AUTO-ENTMCNC: 32.9 G/DL (ref 32.2–35.5)
MCV RBC AUTO: 98 FL (ref 83–98)
PDW BLD AUTO: 10.6 FL (ref 9.4–12.3)
PLATELET # BLD AUTO: 204 K/UL (ref 150–369)
RBC # BLD AUTO: 4.06 M/UL (ref 3.93–5.22)
TSH SERPL DL<=0.05 MIU/L-ACNC: 2.48 MIU/L (ref 0.34–5.6)
WBC # BLD AUTO: 4.92 K/UL (ref 3.8–10.5)

## 2020-06-04 PROCEDURE — 82306 VITAMIN D 25 HYDROXY: CPT

## 2020-06-04 PROCEDURE — 36415 COLL VENOUS BLD VENIPUNCTURE: CPT

## 2020-06-04 PROCEDURE — 84443 ASSAY THYROID STIM HORMONE: CPT

## 2020-06-04 PROCEDURE — 85027 COMPLETE CBC AUTOMATED: CPT

## 2020-06-04 PROCEDURE — 82728 ASSAY OF FERRITIN: CPT

## 2020-06-05 PROBLEM — E55.9 VITAMIN D DEFICIENCY: Status: ACTIVE | Noted: 2020-06-05

## 2020-06-08 DIAGNOSIS — F41.9 ANXIETY: ICD-10-CM

## 2020-06-08 RX ORDER — ALPRAZOLAM 0.25 MG/1
0.25 TABLET ORAL 2 TIMES DAILY PRN
Qty: 30 TABLET | Refills: 0 | Status: SHIPPED | OUTPATIENT
Start: 2020-06-08 | End: 2020-10-14 | Stop reason: SDUPTHER

## 2020-06-08 NOTE — TELEPHONE ENCOUNTER
Medicine Refill Request    Last Office Visit: 1/15/2020  Last Telemedicine Visit: Visit date not found    Next Office Visit: Visit date not found  Next Telemedicine Visit: Visit date not found     Last filled 02/28    Current Outpatient Medications:   •  ALPRAZolam (XANAX) 0.25 mg tablet, Take 1 tablet (0.25 mg total) by mouth 2 (two) times a day as needed for anxiety., Disp: 30 tablet, Rfl: 0  •  ARMOUR THYROID 90 mg tablet, Take 1 tablet by mouth once daily., Disp: , Rfl: 11      BP Readings from Last 3 Encounters:   01/15/20 120/80   01/15/19 130/80   09/19/18 110/70       Recent Lab results:  Lab Results   Component Value Date    CHOL 208 (H) 09/24/2018   ,   Lab Results   Component Value Date    HDL 89 09/24/2018   ,   Lab Results   Component Value Date    LDLCALC 103 (H) 09/24/2018   ,   Lab Results   Component Value Date    TRIG 74 09/24/2018        Lab Results   Component Value Date    GLUCOSE NEGATIVE 10/01/2018   , No results found for: HGBA1C      Lab Results   Component Value Date    CREATININE 0.86 09/24/2018       Lab Results   Component Value Date    TSH 2.48 06/04/2020

## 2020-07-09 ENCOUNTER — APPOINTMENT (OUTPATIENT)
Dept: URBAN - METROPOLITAN AREA CLINIC 200 | Age: 50
Setting detail: DERMATOLOGY
End: 2020-07-09

## 2020-07-09 ENCOUNTER — APPOINTMENT (OUTPATIENT)
Dept: URBAN - METROPOLITAN AREA CLINIC 200 | Age: 50
Setting detail: DERMATOLOGY
End: 2020-07-17

## 2020-07-09 DIAGNOSIS — L23.7 ALLERGIC CONTACT DERMATITIS DUE TO PLANTS, EXCEPT FOOD: ICD-10-CM

## 2020-07-09 PROCEDURE — OTHER REASON FOR TELEMEDICINE VISIT: OTHER

## 2020-07-09 PROCEDURE — OTHER CONSENT FOR TELEMEDICINE VISIT OBTAINED: OTHER

## 2020-07-09 PROCEDURE — OTHER PRESCRIPTION: OTHER

## 2020-07-09 PROCEDURE — OTHER PRESCRIPTION MEDICATION MANAGEMENT: OTHER

## 2020-07-09 PROCEDURE — OTHER TELEHEALTH ASSESSMENT: OTHER

## 2020-07-09 PROCEDURE — 99212 OFFICE O/P EST SF 10 MIN: CPT

## 2020-07-09 RX ORDER — BETAMETHASONE DIPROPIONATE 0.5 MG/G
OINTMENT, AUGMENTED TOPICAL
Qty: 1 | Refills: 4 | Status: ERX | COMMUNITY
Start: 2020-07-09

## 2020-07-09 ASSESSMENT — LOCATION SIMPLE DESCRIPTION DERM
LOCATION SIMPLE: RIGHT FOREARM
LOCATION SIMPLE: LEFT FOREARM
LOCATION SIMPLE: LEFT FOREARM
LOCATION SIMPLE: RIGHT FOREARM

## 2020-07-09 ASSESSMENT — LOCATION DETAILED DESCRIPTION DERM
LOCATION DETAILED: RIGHT VENTRAL PROXIMAL FOREARM
LOCATION DETAILED: LEFT VENTRAL MEDIAL PROXIMAL FOREARM
LOCATION DETAILED: LEFT VENTRAL PROXIMAL FOREARM
LOCATION DETAILED: RIGHT VENTRAL PROXIMAL FOREARM
LOCATION DETAILED: RIGHT PROXIMAL DORSAL FOREARM
LOCATION DETAILED: LEFT PROXIMAL DORSAL FOREARM

## 2020-07-09 ASSESSMENT — LOCATION ZONE DERM
LOCATION ZONE: ARM
LOCATION ZONE: ARM

## 2020-07-09 ASSESSMENT — SEVERITY ASSESSMENT 2020: SEVERITY 2020: MODERATE

## 2020-07-09 ASSESSMENT — PAIN INTENSITY VAS: HOW INTENSE IS YOUR PAIN 0 BEING NO PAIN, 10 BEING THE MOST SEVERE PAIN POSSIBLE?: NO PAIN

## 2020-07-09 NOTE — PROCEDURE: PRESCRIPTION MEDICATION MANAGEMENT
Render In Strict Bullet Format?: No
Detail Level: Detailed
Initiate Treatment: betamethasone, augmented 0.05 % topical ointment

## 2020-07-14 ENCOUNTER — RX ONLY (RX ONLY)
Age: 50
End: 2020-07-14

## 2020-07-14 RX ORDER — PREDNISONE 10 MG/1
TABLET ORAL
Qty: 25 | Refills: 0 | Status: ERX | COMMUNITY
Start: 2020-07-14

## 2020-07-20 ENCOUNTER — TELEPHONE (OUTPATIENT)
Dept: PRIMARY CARE | Facility: CLINIC | Age: 50
End: 2020-07-20

## 2020-07-20 ENCOUNTER — RX ONLY (RX ONLY)
Age: 50
End: 2020-07-20

## 2020-07-20 RX ORDER — PREDNISONE 10 MG/1
TABLET ORAL
Qty: 30 | Refills: 0 | Status: ERX

## 2020-07-20 NOTE — TELEPHONE ENCOUNTER
Pt phoned the office o 7/18/2020 in the morning with complaint of persistent poison ivy.The left eye lid is now swollen. She states she has been followed by her dermatologist, Dr Flores. The pt initially had the rash on her limbs and was given at topical. When it continued to spread Dr. Flores gave her prednisone. 10 mg. She has been taking 3 tabs daily and this is day #4 but she is not clearing the rash. Now the eye is swollen and the areas are very itchy. Tried benadryl and thought it might have made it worse.  Pt has tried calling Dr. Flores's office, but has not heard back and is not confident the emergency line went through.    Plan: Pt has #12 tabs of prednisone left.   She will take 6 10 mg tabs in divided doses on 7/18 and 4 tabs on 7/19.  Pt will call the office on 7/20 and this staff will reach out to her the same day for an appointment to follow up till the rash is clear.

## 2020-07-24 NOTE — TELEPHONE ENCOUNTER
Patient called back and wanted to thank Dr Ford for adjusting her medication and Dr Flores called in more.

## 2020-10-14 DIAGNOSIS — F41.9 ANXIETY: ICD-10-CM

## 2020-10-14 RX ORDER — ALPRAZOLAM 0.25 MG/1
0.25 TABLET ORAL 2 TIMES DAILY PRN
Qty: 30 TABLET | Refills: 0 | Status: SHIPPED | OUTPATIENT
Start: 2020-10-14 | End: 2020-12-22 | Stop reason: SDUPTHER

## 2020-10-14 NOTE — TELEPHONE ENCOUNTER
Medicine Refill Request    Last Office Visit: Visit date not found  Last Telemedicine Visit: Visit date not found    Next Office Visit: Visit date not found  Next Telemedicine Visit: Visit date not found   Last seen 01/2020      Current Outpatient Medications:   •  ALPRAZolam (XANAX) 0.25 mg tablet, Take 1 tablet (0.25 mg total) by mouth 2 (two) times a day as needed for anxiety., Disp: 30 tablet, Rfl: 0  •  ARMOUR THYROID 90 mg tablet, Take 1 tablet by mouth once daily., Disp: , Rfl: 11      BP Readings from Last 3 Encounters:   01/15/20 120/80   01/15/19 130/80   09/19/18 110/70       Recent Lab results:  Lab Results   Component Value Date    CHOL 208 (H) 09/24/2018   ,   Lab Results   Component Value Date    HDL 89 09/24/2018   ,   Lab Results   Component Value Date    LDLCALC 103 (H) 09/24/2018   ,   Lab Results   Component Value Date    TRIG 74 09/24/2018        Lab Results   Component Value Date    GLUCOSE NEGATIVE 10/01/2018   , No results found for: HGBA1C      Lab Results   Component Value Date    CREATININE 0.86 09/24/2018       Lab Results   Component Value Date    TSH 2.48 06/04/2020

## 2020-10-15 ENCOUNTER — APPOINTMENT (OUTPATIENT)
Dept: LAB | Age: 50
End: 2020-10-15
Attending: INTERNAL MEDICINE
Payer: COMMERCIAL

## 2020-10-15 DIAGNOSIS — E55.9 VITAMIN D DEFICIENCY, UNSPECIFIED: ICD-10-CM

## 2020-10-15 DIAGNOSIS — E03.9 HYPOTHYROIDISM, UNSPECIFIED: ICD-10-CM

## 2020-10-15 DIAGNOSIS — R79.0 ABNORMAL LEVEL OF BLOOD MINERAL: ICD-10-CM

## 2020-10-15 PROCEDURE — 30099997 SPECIMEN PROCESSING

## 2020-10-22 ENCOUNTER — OFFICE VISIT (OUTPATIENT)
Dept: PRIMARY CARE | Facility: CLINIC | Age: 50
End: 2020-10-22
Payer: COMMERCIAL

## 2020-10-22 VITALS
HEART RATE: 70 BPM | BODY MASS INDEX: 30.95 KG/M2 | TEMPERATURE: 97.4 F | WEIGHT: 168.2 LBS | OXYGEN SATURATION: 97 % | SYSTOLIC BLOOD PRESSURE: 120 MMHG | DIASTOLIC BLOOD PRESSURE: 80 MMHG | HEIGHT: 62 IN

## 2020-10-22 DIAGNOSIS — Z23 NEED FOR VACCINATION: ICD-10-CM

## 2020-10-22 DIAGNOSIS — Z12.11 COLON CANCER SCREENING: ICD-10-CM

## 2020-10-22 DIAGNOSIS — F41.9 ANXIETY: Primary | ICD-10-CM

## 2020-10-22 DIAGNOSIS — E03.9 PRIMARY HYPOTHYROIDISM: ICD-10-CM

## 2020-10-22 PROBLEM — J01.90 ACUTE INFECTION OF NASAL SINUS: Status: RESOLVED | Noted: 2020-01-15 | Resolved: 2020-10-22

## 2020-10-22 PROCEDURE — 90471 IMMUNIZATION ADMIN: CPT | Performed by: NURSE PRACTITIONER

## 2020-10-22 PROCEDURE — 90686 IIV4 VACC NO PRSV 0.5 ML IM: CPT | Performed by: NURSE PRACTITIONER

## 2020-10-22 PROCEDURE — 99213 OFFICE O/P EST LOW 20 MIN: CPT | Mod: 25 | Performed by: NURSE PRACTITIONER

## 2020-10-22 RX ORDER — LEVOTHYROXINE SODIUM 100 UG/1
TABLET ORAL
COMMUNITY
Start: 2020-09-24

## 2020-10-22 SDOH — HEALTH STABILITY: MENTAL HEALTH: HOW OFTEN DO YOU HAVE A DRINK CONTAINING ALCOHOL?: 2-3 TIMES A WEEK

## 2020-10-22 ASSESSMENT — ENCOUNTER SYMPTOMS
WEIGHT LOSS: 0
DECREASED CONCENTRATION: 0
PANIC: 0
VISUAL CHANGE: 0
DRY SKIN: 0
PALPITATIONS: 0
SHORTNESS OF BREATH: 0
HOARSE VOICE: 0
INSOMNIA: 1
FATIGUE: 0
DIARRHEA: 0
DEPRESSED MOOD: 0
HAIR LOSS: 0
RESTLESSNESS: 0
DIAPHORESIS: 0
NERVOUS/ANXIOUS: 1
CONSTIPATION: 0
WEIGHT GAIN: 0

## 2020-10-22 ASSESSMENT — PATIENT HEALTH QUESTIONNAIRE - PHQ9: SUM OF ALL RESPONSES TO PHQ9 QUESTIONS 1 & 2: 0

## 2020-10-22 NOTE — PROGRESS NOTES
[facility]  Katie Fontana  [address]  [place] [address]  Phone: [phone]  Fax: [phone]      Patient ID: Maryjo Tovar                              : 1970    Visit Date: 10/22/2020    Chief Complaint: Med Management         Patient ID: Maryjo Tovar is a 50 y.o. female.    Patient Active Problem List   Diagnosis   • Allergic rhinitis   • Anxiety   • Atopic dermatitis   • Family history of coronary artery disease   • Hemorrhoids, external   • Primary hypothyroidism   • Insomnia due to medical condition   • Polycystic ovarian disease   • Vitamin D deficiency         Current Outpatient Medications:   •  ALPRAZolam (XANAX) 0.25 mg tablet, Take 1 tablet (0.25 mg total) by mouth 2 (two) times a day as needed for anxiety., Disp: 30 tablet, Rfl: 0  •  SYNTHROID 100 mcg tablet, TAKE 8 TABLETS BY MOUTH WEEKLY, Disp: , Rfl:     Allergies   Allergen Reactions   • Sulfa (Sulfonamide Antibiotics) Hives       Social History     Tobacco Use   • Smoking status: Never Smoker   • Smokeless tobacco: Never Used   Substance Use Topics   • Alcohol use: Yes     Frequency: 2-3 times a week   • Drug use: Never       Health Maintenance   Topic Date Due   • Cervical Cancer Screening  1991   • Colonoscopy  2020   • Influenza Vaccine (1) 2020   • Zoster Vaccine (1 of 2) 10/22/2021 (Originally 2020)   • Mammogram  2021   • Meningococcal ACWY  Aged Out   • HIB Vaccines  Aged Out   • IPV Vaccines  Aged Out   • HPV Vaccines  Aged Out   • Pneumococcal  Aged Out   • DTaP, Tdap, and Td Vaccines  Discontinued   • Varicella Vaccines  Discontinued   • HIV Screening  Discontinued   • Hepatitis C Screening  Discontinued       HPI  Med refill/ follow up    Anxiety  Presents for follow-up visit. Symptoms include excessive worry, insomnia and nervous/anxious behavior. Patient reports no chest pain, decreased concentration, depressed mood, palpitations,  "panic, restlessness, shortness of breath or suicidal ideas. Symptoms occur occasionally. The severity of symptoms is moderate. The quality of sleep is good. Nighttime awakenings: occasional.     Compliance with medications: PRN Alprazolam. Treatment side effects: None.   Thyroid Problem  Presents for follow-up (hypothyroidism) visit. Symptoms include anxiety. Patient reports no cold intolerance, constipation, depressed mood, diaphoresis, diarrhea, dry skin, fatigue, hair loss, heat intolerance, hoarse voice, leg swelling, palpitations, visual change, weight gain or weight loss. The symptoms have been stable.       The following have been reviewed and updated as appropriate in this visit:         Review of System  Review of Systems   Constitutional: Negative for diaphoresis, fatigue, weight gain and weight loss.   HENT: Negative for hoarse voice.    Respiratory: Negative for shortness of breath.    Cardiovascular: Negative for chest pain and palpitations.   Gastrointestinal: Negative for constipation and diarrhea.   Endocrine: Negative for cold intolerance and heat intolerance.   Psychiatric/Behavioral: Negative for decreased concentration and suicidal ideas. The patient is nervous/anxious and has insomnia.        Objective     Vitals  Vitals:    10/22/20 1055   BP: 120/80   BP Location: Left upper arm   Patient Position: Sitting   Pulse: 70   Temp: 36.3 °C (97.4 °F)   SpO2: 97%   Weight: 76.3 kg (168 lb 3.2 oz)   Height: 1.575 m (5' 2\")     Body mass index is 30.76 kg/m².    Physical Exam  Physical Exam  Vitals signs reviewed.   Constitutional:       General: She is not in acute distress.     Appearance: Normal appearance. She is not diaphoretic.   Neck:      Musculoskeletal: Neck supple. No neck rigidity or muscular tenderness.      Thyroid: No thyromegaly.   Cardiovascular:      Rate and Rhythm: Normal rate and regular rhythm.      Heart sounds: No murmur. No friction rub. No gallop.    Pulmonary:      Effort: " Pulmonary effort is normal.      Breath sounds: Normal breath sounds. No wheezing, rhonchi or rales.   Lymphadenopathy:      Cervical: No cervical adenopathy.   Neurological:      Mental Status: She is alert and oriented to person, place, and time.   Psychiatric:         Mood and Affect: Mood normal.         Speech: Speech normal.         Behavior: Behavior normal.         Thought Content: Thought content does not include suicidal ideation. Thought content does not include suicidal plan.         Assessment/Plan     Problem List Items Addressed This Visit     Anxiety - Primary     Stable.  Uses Alprazolam sparingly.  Renewed last week.  Follow up 6 mo.         Primary hypothyroidism     Endocrine following.  On Synthroid now-- stable.         Relevant Medications    SYNTHROID 100 mcg tablet      Other Visit Diagnoses     Need for vaccination        Relevant Orders    Influenza vaccine quadrivalent preservative free 6 mon and older IM (FluLaval) (Completed)    Colon cancer screening        Relevant Orders    Ambulatory referral to Gastroenterology              LUIS CARLOS Staley  10/22/2020

## 2020-12-22 DIAGNOSIS — F41.9 ANXIETY: ICD-10-CM

## 2020-12-22 RX ORDER — ALPRAZOLAM 0.25 MG/1
0.25 TABLET ORAL 2 TIMES DAILY PRN
Qty: 30 TABLET | Refills: 0 | Status: SHIPPED | OUTPATIENT
Start: 2020-12-22 | End: 2021-04-08

## 2020-12-22 NOTE — TELEPHONE ENCOUNTER
Medicine Refill Request    Last Office Visit: 10/22/2020  Last Telemedicine Visit: Visit date not found    Next Office Visit: Visit date not found  Next Telemedicine Visit: Visit date not found         Current Outpatient Medications:   •  ALPRAZolam (XANAX) 0.25 mg tablet, Take 1 tablet (0.25 mg total) by mouth 2 (two) times a day as needed for anxiety., Disp: 30 tablet, Rfl: 0  •  SYNTHROID 100 mcg tablet, TAKE 8 TABLETS BY MOUTH WEEKLY, Disp: , Rfl:       BP Readings from Last 3 Encounters:   10/22/20 120/80   01/15/20 120/80   01/15/19 130/80       Recent Lab results:  Lab Results   Component Value Date    CHOL 208 (H) 09/24/2018   ,   Lab Results   Component Value Date    HDL 89 09/24/2018   ,   Lab Results   Component Value Date    LDLCALC 103 (H) 09/24/2018   ,   Lab Results   Component Value Date    TRIG 74 09/24/2018        Lab Results   Component Value Date    GLUCOSE NEGATIVE 10/01/2018   , No results found for: HGBA1C      Lab Results   Component Value Date    CREATININE 0.86 09/24/2018       Lab Results   Component Value Date    TSH 2.48 06/04/2020

## 2021-03-18 DIAGNOSIS — F41.9 ANXIETY: ICD-10-CM

## 2021-03-18 NOTE — TELEPHONE ENCOUNTER
Medicine Refill Request    Last Office Visit: 10/22/2020  Last Telemedicine Visit: Visit date not found    Next Office Visit: Visit date not found  Next Telemedicine Visit: Visit date not found   Pt due for an appointment      Current Outpatient Medications:   •  ALPRAZolam (XANAX) 0.25 mg tablet, Take 1 tablet (0.25 mg total) by mouth 2 (two) times a day as needed for anxiety., Disp: 30 tablet, Rfl: 0  •  SYNTHROID 100 mcg tablet, TAKE 8 TABLETS BY MOUTH WEEKLY, Disp: , Rfl:       BP Readings from Last 3 Encounters:   10/22/20 120/80   01/15/20 120/80   01/15/19 130/80       Recent Lab results:  Lab Results   Component Value Date    CHOL 208 (H) 09/24/2018   ,   Lab Results   Component Value Date    HDL 89 09/24/2018   ,   Lab Results   Component Value Date    LDLCALC 103 (H) 09/24/2018   ,   Lab Results   Component Value Date    TRIG 74 09/24/2018        Lab Results   Component Value Date    GLUCOSE NEGATIVE 10/01/2018   , No results found for: HGBA1C      Lab Results   Component Value Date    CREATININE 0.86 09/24/2018       Lab Results   Component Value Date    TSH 2.48 06/04/2020

## 2021-03-22 RX ORDER — ALPRAZOLAM 0.25 MG/1
0.25 TABLET ORAL 2 TIMES DAILY PRN
Qty: 30 TABLET | Refills: 0 | OUTPATIENT
Start: 2021-03-22

## 2021-04-01 ENCOUNTER — OFFICE VISIT (OUTPATIENT)
Dept: PRIMARY CARE | Facility: CLINIC | Age: 51
End: 2021-04-01
Payer: COMMERCIAL

## 2021-04-01 VITALS
RESPIRATION RATE: 12 BRPM | SYSTOLIC BLOOD PRESSURE: 122 MMHG | BODY MASS INDEX: 31.1 KG/M2 | WEIGHT: 169 LBS | TEMPERATURE: 97 F | HEIGHT: 62 IN | OXYGEN SATURATION: 99 % | DIASTOLIC BLOOD PRESSURE: 70 MMHG | HEART RATE: 64 BPM

## 2021-04-01 DIAGNOSIS — F41.9 ANXIETY: ICD-10-CM

## 2021-04-01 DIAGNOSIS — N60.19 FIBROCYSTIC BREAST DISEASE (FCBD), UNSPECIFIED LATERALITY: ICD-10-CM

## 2021-04-01 DIAGNOSIS — E78.00 HYPERCHOLESTEROLEMIA: ICD-10-CM

## 2021-04-01 DIAGNOSIS — G47.01 INSOMNIA DUE TO MEDICAL CONDITION: ICD-10-CM

## 2021-04-01 DIAGNOSIS — E03.9 PRIMARY HYPOTHYROIDISM: Primary | ICD-10-CM

## 2021-04-01 PROCEDURE — 3008F BODY MASS INDEX DOCD: CPT | Performed by: NURSE PRACTITIONER

## 2021-04-01 PROCEDURE — 99214 OFFICE O/P EST MOD 30 MIN: CPT | Performed by: NURSE PRACTITIONER

## 2021-04-01 ASSESSMENT — ENCOUNTER SYMPTOMS
WEIGHT GAIN: 0
RESTLESSNESS: 0
INSOMNIA: 1
LEG PAIN: 0
FOCAL SENSORY LOSS: 0
DEPRESSED MOOD: 0
PALPITATIONS: 0
DRY SKIN: 0
MYALGIAS: 0
CONSTIPATION: 0
HAIR LOSS: 0
NERVOUS/ANXIOUS: 1
PANIC: 0
DIARRHEA: 0
WEIGHT LOSS: 0
FOCAL WEAKNESS: 0
HOARSE VOICE: 0
SHORTNESS OF BREATH: 0
FATIGUE: 0
DECREASED CONCENTRATION: 0

## 2021-04-01 ASSESSMENT — PATIENT HEALTH QUESTIONNAIRE - PHQ9: SUM OF ALL RESPONSES TO PHQ9 QUESTIONS 1 & 2: 0

## 2021-04-01 NOTE — PROGRESS NOTES
[facility] at [address]  [place] [address]  [phone]  Fax [phone]      Patient ID: Maryjo Tovar                              : 1970    Visit Date: 2021    Chief Complaint: Follow-up         Patient ID: Maryjo Tovar is a 50 y.o. female.    Patient Active Problem List   Diagnosis   • Allergic rhinitis   • Anxiety   • Atopic dermatitis   • Family history of coronary artery disease   • Hemorrhoids, external   • Primary hypothyroidism   • Insomnia due to medical condition   • Polycystic ovarian disease   • Vitamin D deficiency   • Hypercholesterolemia         Current Outpatient Medications:   •  ALPRAZolam (XANAX) 0.25 mg tablet, Take 1 tablet (0.25 mg total) by mouth 2 (two) times a day as needed for anxiety., Disp: 30 tablet, Rfl: 0  •  SYNTHROID 100 mcg tablet, TAKE 8 TABLETS BY MOUTH WEEKLY, Disp: , Rfl:     Allergies   Allergen Reactions   • Sulfa (Sulfonamide Antibiotics) Hives       Social History     Tobacco Use   • Smoking status: Never Smoker   • Smokeless tobacco: Never Used   Substance Use Topics   • Alcohol use: Yes     Frequency: 2-3 times a week   • Drug use: Never       Health Maintenance   Topic Date Due   • Mammogram  2021   • Colonoscopy  2021 (Originally 2020)   • Cervical Cancer Screening  10/21/2021 (Originally 2021)   • Zoster Vaccine (1 of 2) 10/22/2021 (Originally 2020)   • Influenza Vaccine  Completed   • COVID-19 Vaccine  Completed   • Meningococcal ACWY  Aged Out   • HIB Vaccines  Aged Out   • IPV Vaccines  Aged Out   • HPV Vaccines  Aged Out   • Pneumococcal  Aged Out   • DTaP, Tdap, and Td Vaccines  Discontinued   • Varicella Vaccines  Discontinued   • HIV Screening  Discontinued   • Hepatitis C Screening  Discontinued       HPI  Routine follow up. Med refill.    Anxiety  Presents for follow-up visit. Symptoms include excessive worry, insomnia and nervous/anxious behavior.  Patient reports no chest pain, decreased concentration, depressed mood, palpitations, panic, restlessness, shortness of breath or suicidal ideas. Symptoms occur occasionally. The severity of symptoms is moderate. The quality of sleep is good. Nighttime awakenings: occasional.     Compliance with medications: PRN Alprazolam. Treatment side effects: None.   Thyroid Problem  Presents for follow-up (hypothyroidism on Synthroid.) visit. Symptoms include anxiety. Patient reports no constipation, depressed mood, diarrhea, dry skin, fatigue, hair loss, heat intolerance, hoarse voice, leg swelling, palpitations, weight gain or weight loss. The symptoms have been stable. Her past medical history is significant for hyperlipidemia. There is no history of diabetes.   Hyperlipidemia  This is a new problem. Lipid results: borderline high. Exacerbating diseases include hypothyroidism. She has no history of chronic renal disease, diabetes, liver disease, obesity or nephrotic syndrome. There are no known factors aggravating her hyperlipidemia. Pertinent negatives include no chest pain, focal sensory loss, focal weakness, leg pain, myalgias or shortness of breath. Current antihyperlipidemic treatment includes diet change and exercise.       The following have been reviewed and updated as appropriate in this visit:         Review of System  Review of Systems   Constitutional: Negative for fatigue, weight gain and weight loss.   HENT: Negative for hoarse voice.    Respiratory: Negative for shortness of breath.    Cardiovascular: Negative for chest pain and palpitations.   Gastrointestinal: Negative for constipation and diarrhea.   Endocrine: Negative for heat intolerance.   Musculoskeletal: Negative for myalgias.   Neurological: Negative for focal weakness.   Psychiatric/Behavioral: Negative for decreased concentration and suicidal ideas. The patient is nervous/anxious and has insomnia.        Objective     Vitals  Vitals:    04/01/21  "1030   BP: 122/70   BP Location: Left upper arm   Patient Position: Sitting   Pulse: 64   Resp: 12   Temp: 36.1 °C (97 °F)   SpO2: 99%   Weight: 76.7 kg (169 lb)   Height: 1.575 m (5' 2\")     Body mass index is 30.91 kg/m².    Physical Exam  Physical Exam  Vitals signs reviewed.   Constitutional:       General: She is not in acute distress.     Appearance: Normal appearance. She is not diaphoretic.   Cardiovascular:      Rate and Rhythm: Normal rate and regular rhythm.      Heart sounds: No murmur. No friction rub. No gallop.    Pulmonary:      Effort: Pulmonary effort is normal.      Breath sounds: Normal breath sounds. No wheezing, rhonchi or rales.   Musculoskeletal:      Right lower leg: No edema.      Left lower leg: No edema.   Neurological:      Mental Status: She is alert and oriented to person, place, and time.   Psychiatric:         Mood and Affect: Mood and affect normal.         Speech: Speech normal.         Behavior: Behavior normal.         Assessment/Plan     Problem List Items Addressed This Visit     Anxiety     Stable.  PRN Alprazolam.  Uses sparingly.         Primary hypothyroidism - Primary     Endocrine follows.  Stable on Synthroid daily.         Insomnia due to medical condition     PRN Alprazolam.  Uses sparingly.         Hypercholesterolemia     Check lipids/CMP fasting.         Relevant Orders    Comprehensive metabolic panel    Lipid panel      Other Visit Diagnoses     Fibrocystic breast disease (FCBD), unspecified laterality        Relevant Orders    BI DIAGNOSTIC MAMMOGRAM BILATERAL (TOMOSYNTHESIS)              LUIS CARLOS Staley  4/1/2021  "

## 2021-04-18 LAB
ALBUMIN SERPL-MCNC: 4.3 G/DL (ref 3.8–4.8)
ALBUMIN/GLOB SERPL: 2 {RATIO} (ref 1.2–2.2)
ALP SERPL-CCNC: 49 IU/L (ref 39–117)
ALT SERPL-CCNC: 12 IU/L (ref 0–32)
AST SERPL-CCNC: 15 IU/L (ref 0–40)
BILIRUB SERPL-MCNC: 0.6 MG/DL (ref 0–1.2)
BUN SERPL-MCNC: 14 MG/DL (ref 6–24)
BUN/CREAT SERPL: 17 (ref 9–23)
CALCIUM SERPL-MCNC: 9.5 MG/DL (ref 8.7–10.2)
CHLORIDE SERPL-SCNC: 104 MMOL/L (ref 96–106)
CHOLEST SERPL-MCNC: 198 MG/DL (ref 100–199)
CO2 SERPL-SCNC: 23 MMOL/L (ref 20–29)
CREAT SERPL-MCNC: 0.83 MG/DL (ref 0.57–1)
GLOBULIN SER CALC-MCNC: 2.1 G/DL (ref 1.5–4.5)
GLUCOSE SERPL-MCNC: 96 MG/DL (ref 65–99)
HDLC SERPL-MCNC: 76 MG/DL
LAB CORP EGFR IF AFRICN AM: 95 ML/MIN/1.73
LAB CORP EGFR IF NONAFRICN AM: 82 ML/MIN/1.73
LDLC SERPL CALC-MCNC: 110 MG/DL (ref 0–99)
POTASSIUM SERPL-SCNC: 4.5 MMOL/L (ref 3.5–5.2)
PROT SERPL-MCNC: 6.4 G/DL (ref 6–8.5)
SODIUM SERPL-SCNC: 139 MMOL/L (ref 134–144)
TRIGL SERPL-MCNC: 67 MG/DL (ref 0–149)
VLDLC SERPL CALC-MCNC: 12 MG/DL (ref 5–40)

## 2021-06-03 DIAGNOSIS — F41.9 ANXIETY: ICD-10-CM

## 2021-06-03 RX ORDER — ALPRAZOLAM 0.25 MG/1
0.25 TABLET ORAL 2 TIMES DAILY PRN
Qty: 30 TABLET | Refills: 0 | Status: SHIPPED | OUTPATIENT
Start: 2021-06-03 | End: 2021-09-15 | Stop reason: SDUPTHER

## 2021-06-03 NOTE — TELEPHONE ENCOUNTER
Medicine Refill Request    Last Office Visit: 4/1/2021  Last Telemedicine Visit: Visit date not found    Next Office Visit: Visit date not found  Next Telemedicine Visit: Visit date not found         Current Outpatient Medications:   •  ALPRAZolam (XANAX) 0.25 mg tablet, TAKE 1 TABLET BY MOUTH TWO TIMES DAILY AS NEEDED FOR ANXIETY, Disp: 30 tablet, Rfl: 0  •  SYNTHROID 100 mcg tablet, TAKE 8 TABLETS BY MOUTH WEEKLY, Disp: , Rfl:       BP Readings from Last 3 Encounters:   04/01/21 122/70   10/22/20 120/80   01/15/20 120/80       Recent Lab results:  Lab Results   Component Value Date    CHOL 198 04/17/2021   ,   Lab Results   Component Value Date    HDL 76 04/17/2021   ,   Lab Results   Component Value Date    LDLCALC 110 (H) 04/17/2021   ,   Lab Results   Component Value Date    TRIG 67 04/17/2021        Lab Results   Component Value Date    GLUCOSE 96 04/17/2021   , No results found for: HGBA1C      Lab Results   Component Value Date    CREATININE 0.83 04/17/2021       Lab Results   Component Value Date    TSH 2.48 06/04/2020

## 2021-09-15 DIAGNOSIS — F41.9 ANXIETY: ICD-10-CM

## 2021-09-15 NOTE — TELEPHONE ENCOUNTER
Medicine Refill Request    Last Office Visit: 4/1/2021  Last Telemedicine Visit: Visit date not found    Next Office Visit: Visit date not found  Next Telemedicine Visit: Visit date not found     Patient will be due for an appointment soon.    Current Outpatient Medications:   •  ALPRAZolam (XANAX) 0.25 mg tablet, Take 1 tablet (0.25 mg total) by mouth 2 (two) times a day as needed for anxiety., Disp: 30 tablet, Rfl: 0  •  SYNTHROID 100 mcg tablet, TAKE 8 TABLETS BY MOUTH WEEKLY, Disp: , Rfl:       BP Readings from Last 3 Encounters:   04/01/21 122/70   10/22/20 120/80   01/15/20 120/80       Recent Lab results:  Lab Results   Component Value Date    CHOL 198 04/17/2021   ,   Lab Results   Component Value Date    HDL 76 04/17/2021   ,   Lab Results   Component Value Date    LDLCALC 110 (H) 04/17/2021   ,   Lab Results   Component Value Date    TRIG 67 04/17/2021        Lab Results   Component Value Date    GLUCOSE 96 04/17/2021   , No results found for: HGBA1C      Lab Results   Component Value Date    CREATININE 0.83 04/17/2021       Lab Results   Component Value Date    TSH 2.48 06/04/2020

## 2021-09-16 RX ORDER — ALPRAZOLAM 0.25 MG/1
0.25 TABLET ORAL 2 TIMES DAILY PRN
Qty: 30 TABLET | Refills: 0 | Status: SHIPPED | OUTPATIENT
Start: 2021-09-16 | End: 2021-12-07 | Stop reason: SDUPTHER

## 2021-10-06 ENCOUNTER — OFFICE VISIT (OUTPATIENT)
Dept: PRIMARY CARE | Facility: CLINIC | Age: 51
End: 2021-10-06
Payer: COMMERCIAL

## 2021-10-06 VITALS
OXYGEN SATURATION: 98 % | TEMPERATURE: 98.1 F | BODY MASS INDEX: 30.88 KG/M2 | WEIGHT: 167.8 LBS | SYSTOLIC BLOOD PRESSURE: 114 MMHG | DIASTOLIC BLOOD PRESSURE: 82 MMHG | HEIGHT: 62 IN | HEART RATE: 73 BPM

## 2021-10-06 DIAGNOSIS — E55.9 VITAMIN D DEFICIENCY: ICD-10-CM

## 2021-10-06 DIAGNOSIS — Z12.11 COLON CANCER SCREENING: ICD-10-CM

## 2021-10-06 DIAGNOSIS — E03.9 PRIMARY HYPOTHYROIDISM: ICD-10-CM

## 2021-10-06 DIAGNOSIS — F41.9 ANXIETY: ICD-10-CM

## 2021-10-06 DIAGNOSIS — E78.00 HYPERCHOLESTEROLEMIA: ICD-10-CM

## 2021-10-06 DIAGNOSIS — M54.50 CHRONIC RIGHT-SIDED LOW BACK PAIN WITHOUT SCIATICA: Primary | ICD-10-CM

## 2021-10-06 DIAGNOSIS — G89.29 CHRONIC RIGHT-SIDED LOW BACK PAIN WITHOUT SCIATICA: Primary | ICD-10-CM

## 2021-10-06 PROCEDURE — 3008F BODY MASS INDEX DOCD: CPT | Performed by: NURSE PRACTITIONER

## 2021-10-06 PROCEDURE — 99214 OFFICE O/P EST MOD 30 MIN: CPT | Performed by: NURSE PRACTITIONER

## 2021-10-06 NOTE — ASSESSMENT & PLAN NOTE
Results for MARTI ANTONY (MRN [id number]) as of 10/6/2021 13:51   Ref. Range 4/17/2021 09:55   Cholesterol Latest Ref Range: 100 - 199 mg/dL 198   Triglycerides Latest Ref Range: 0 - 149 mg/dL 67   HDL Latest Ref Range: >39 mg/dL 76   LDL Calculated Latest Ref Range: 0 - 99 mg/dL 110 (H)     Borderline LDL elevation.  Lowfat diet, low cholesterol diet.  Regular exercise.  Weight loss.  Repeat yearly.

## 2021-10-06 NOTE — PROGRESS NOTES
[facility] at [address]  [place] [address]  [phone]  Fax [phone]      Patient ID: Maryjo Tovar                              : 1970    Visit Date: 10/6/2021    Chief Complaint: Med Management         Patient ID: Maryjo Tovar is a 51 y.o. female.    Patient Active Problem List   Diagnosis   • Allergic rhinitis   • Anxiety   • Atopic dermatitis   • Family history of coronary artery disease   • Hemorrhoids, external   • Primary hypothyroidism   • Insomnia due to medical condition   • Polycystic ovarian disease   • Vitamin D deficiency   • Hypercholesterolemia   • Colon cancer screening   • Chronic right-sided low back pain without sciatica         Current Outpatient Medications:   •  ALPRAZolam (XANAX) 0.25 mg tablet, Take 1 tablet (0.25 mg total) by mouth 2 (two) times a day as needed for anxiety., Disp: 30 tablet, Rfl: 0  •  SYNTHROID 100 mcg tablet, TAKE 8 TABLETS BY MOUTH WEEKLY, Disp: , Rfl:     Allergies   Allergen Reactions   • Sulfa (Sulfonamide Antibiotics) Hives       Social History     Tobacco Use   • Smoking status: Never Smoker   • Smokeless tobacco: Never Used   Vaping Use   • Vaping Use: Never used   Substance Use Topics   • Alcohol use: Yes   • Drug use: Never       Health Maintenance   Topic Date Due   • Mammogram  2021   • Cervical Cancer Screening  10/21/2021 (Originally 2021)   • Zoster Vaccine (1 of 2) 10/22/2021 (Originally 2020)   • Influenza Vaccine (1) 10/06/2022 (Originally 2021)   • Colonoscopy  10/12/2022 (Originally 2020)   • COVID-19 Vaccine  Completed   • Meningococcal ACWY  Aged Out   • HIB Vaccines  Aged Out   • IPV Vaccines  Aged Out   • HPV Vaccines  Aged Out   • Pneumococcal  Aged Out   • DTaP, Tdap, and Td Vaccines  Discontinued   • HIV Screening  Discontinued   • Hepatitis C Screening  Discontinued       HPI  Follow up-med check.    Anxiety  Presents for follow-up visit.  Symptoms include excessive worry, insomnia and nervous/anxious behavior. Patient reports no chest pain, depressed mood, palpitations, panic, shortness of breath or suicidal ideas. Symptoms occur occasionally. The severity of symptoms is moderate. The quality of sleep is good.     Compliance with medications: uses Alprazolam PRN.   Thyroid Problem  Presents for follow-up (hypothyroidisim on Synthroid) visit. Symptoms include anxiety. Patient reports no depressed mood, dry skin, fatigue, hair loss, hoarse voice, leg swelling, palpitations, weight gain or weight loss. The symptoms have been stable.   Back Pain  This is a chronic problem. The current episode started more than 1 year ago. The problem occurs intermittently. The problem is unchanged. The pain is present in the lumbar spine. The quality of the pain is described as aching. The pain does not radiate. The pain is moderate. The symptoms are aggravated by bending and position. Pertinent negatives include no bladder incontinence, bowel incontinence, chest pain, leg pain, numbness, tingling, weakness or weight loss. She has tried chiropractic manipulation and NSAIDs for the symptoms. The treatment provided moderate relief.       The following have been reviewed and updated as appropriate in this visit:  Allergies  Meds  Problems         Review of System  Review of Systems   Constitutional: Negative for fatigue, weight gain and weight loss.   HENT: Negative for hoarse voice.    Respiratory: Negative for shortness of breath.    Cardiovascular: Negative for chest pain and palpitations.   Gastrointestinal: Negative for bowel incontinence.   Genitourinary: Negative for bladder incontinence.   Musculoskeletal: Positive for back pain.   Neurological: Negative for tingling, weakness and numbness.   Psychiatric/Behavioral: Negative for suicidal ideas. The patient is nervous/anxious and has insomnia.        Objective     Vitals  Vitals:    10/06/21 1551   BP: 114/82   BP  "Location: Left upper arm   Patient Position: Sitting   Pulse: 73   Temp: 36.7 °C (98.1 °F)   SpO2: 98%   Weight: 76.1 kg (167 lb 12.8 oz)   Height: 1.575 m (5' 2\")     Body mass index is 30.69 kg/m².    Physical Exam  Physical Exam  Vitals reviewed.   Constitutional:       General: She is not in acute distress.     Appearance: Normal appearance. She is not diaphoretic.   Neck:      Thyroid: No thyromegaly.   Cardiovascular:      Rate and Rhythm: Normal rate and regular rhythm.      Heart sounds: No murmur heard.  No friction rub. No gallop.    Pulmonary:      Effort: Pulmonary effort is normal.      Breath sounds: Normal breath sounds. No wheezing, rhonchi or rales.   Musculoskeletal:      Cervical back: Neck supple. No rigidity or tenderness.      Lumbar back: No swelling, deformity, spasms or tenderness. Normal range of motion.   Lymphadenopathy:      Cervical: No cervical adenopathy.   Neurological:      Mental Status: She is alert and oriented to person, place, and time.   Psychiatric:         Mood and Affect: Mood and affect normal.         Speech: Speech normal.         Behavior: Behavior normal.         Thought Content: Thought content does not include suicidal ideation. Thought content does not include suicidal plan.         Assessment/Plan     Problem List Items Addressed This Visit     Anxiety     Alprazolam PRN  Uses sparingly.  Follow up 6 mo.         Primary hypothyroidism     Results for MARTI ANTONY (MRN [id number]) as of 10/6/2021 13:51   Ref. Range 6/4/2020 09:58   TSH Latest Ref Range: 0.34 - 5.60 mIU/L 2.48    No recent labs.  Endocrine following-- has appt this month.  Has felt good on current dosing.         Vitamin D deficiency     Does not take regularly.  Encouraged to take supplement regularly especially in winter months.         Hypercholesterolemia     Results for MARTI ANTONY (MRN [id number]) as of 10/6/2021 13:51   Ref. Range 4/17/2021 09:55   Cholesterol Latest Ref Range: 100 - " 199 mg/dL 198   Triglycerides Latest Ref Range: 0 - 149 mg/dL 67   HDL Latest Ref Range: >39 mg/dL 76   LDL Calculated Latest Ref Range: 0 - 99 mg/dL 110 (H)     Borderline LDL elevation.  Lowfat diet, low cholesterol diet.  Regular exercise.  Weight loss.  Repeat yearly.         Colon cancer screening     FIT card given.         Relevant Orders    FIT    Chronic right-sided low back pain without sciatica - Primary     Never has had x-rays.  L spine xray ordered.         Relevant Orders    X-RAY LUMBAR SPINE COMPLETE 4+ VIEWS              LUIS CARLOS Staley  10/7/2021

## 2021-10-06 NOTE — ASSESSMENT & PLAN NOTE
Results for MARTI ANTONY (MRN [id number]) as of 10/6/2021 13:51   Ref. Range 6/4/2020 09:58   TSH Latest Ref Range: 0.34 - 5.60 mIU/L 2.48    No recent labs.  Endocrine following-- has appt this month.  Has felt good on current dosing.

## 2021-10-07 ASSESSMENT — ENCOUNTER SYMPTOMS
BOWEL INCONTINENCE: 0
PANIC: 0
DEPRESSED MOOD: 0
FATIGUE: 0
DRY SKIN: 0
TINGLING: 0
NERVOUS/ANXIOUS: 1
HAIR LOSS: 0
SHORTNESS OF BREATH: 0
NUMBNESS: 0
BACK PAIN: 1
PALPITATIONS: 0
WEIGHT GAIN: 0
WEAKNESS: 0
LEG PAIN: 0
HOARSE VOICE: 0
INSOMNIA: 1
WEIGHT LOSS: 0

## 2021-12-07 DIAGNOSIS — F41.9 ANXIETY: ICD-10-CM

## 2021-12-07 RX ORDER — ALPRAZOLAM 0.25 MG/1
0.25 TABLET ORAL 2 TIMES DAILY PRN
Qty: 60 TABLET | Refills: 0 | Status: SHIPPED | OUTPATIENT
Start: 2021-12-07 | End: 2022-06-24 | Stop reason: SDUPTHER

## 2021-12-07 NOTE — TELEPHONE ENCOUNTER
Medicine Refill Request    Last Office Visit: 10/6/2021  Last Telemedicine Visit: Visit date not found    Next Office Visit: Visit date not found  Next Telemedicine Visit: Visit date not found         Current Outpatient Medications:   •  ALPRAZolam (XANAX) 0.25 mg tablet, Take 1 tablet (0.25 mg total) by mouth 2 (two) times a day as needed for anxiety., Disp: 30 tablet, Rfl: 0  •  SYNTHROID 100 mcg tablet, TAKE 8 TABLETS BY MOUTH WEEKLY, Disp: , Rfl:       BP Readings from Last 3 Encounters:   10/06/21 114/82   04/01/21 122/70   10/22/20 120/80       Recent Lab results:  Lab Results   Component Value Date    CHOL 198 04/17/2021   ,   Lab Results   Component Value Date    HDL 76 04/17/2021   ,   Lab Results   Component Value Date    LDLCALC 110 (H) 04/17/2021   ,   Lab Results   Component Value Date    TRIG 67 04/17/2021        Lab Results   Component Value Date    GLUCOSE 96 04/17/2021   , No results found for: HGBA1C      Lab Results   Component Value Date    CREATININE 0.83 04/17/2021       Lab Results   Component Value Date    TSH 2.48 06/04/2020

## 2022-06-24 DIAGNOSIS — F41.9 ANXIETY: ICD-10-CM

## 2022-06-24 RX ORDER — ALPRAZOLAM 0.25 MG/1
0.25 TABLET ORAL 2 TIMES DAILY PRN
Qty: 60 TABLET | Refills: 0 | Status: SHIPPED | OUTPATIENT
Start: 2022-06-24 | End: 2022-10-26 | Stop reason: SDUPTHER

## 2022-06-24 NOTE — TELEPHONE ENCOUNTER
Checked with Lidia and we can schedule daughter as new pt with Alicia tabares to mom to let her know.

## 2022-06-24 NOTE — TELEPHONE ENCOUNTER
Patient called to scheduled appt with Katie for Xanax/Alprazolam med refill 7/13/22 sent to her pharmacy [facility]. She is completely out, let her know Katie is out of the office today and Monday.    Pt also requested to send a message to Katie regarding her 16 years ol daughter. She is looking for a primary and she really wants her to see Katie for a physical that has to be done before August. Let her know she is not taking in new pts. Offered Dr BALL, Dr REDDY, Dr. RODRIGUES, and Dr SESAY and declined. She wants message to be sent to Katie.

## 2022-07-13 ENCOUNTER — OFFICE VISIT (OUTPATIENT)
Dept: PRIMARY CARE | Facility: CLINIC | Age: 52
End: 2022-07-13
Payer: COMMERCIAL

## 2022-07-13 VITALS
HEIGHT: 62 IN | BODY MASS INDEX: 30.29 KG/M2 | DIASTOLIC BLOOD PRESSURE: 82 MMHG | SYSTOLIC BLOOD PRESSURE: 138 MMHG | OXYGEN SATURATION: 97 % | WEIGHT: 164.6 LBS | HEART RATE: 73 BPM | RESPIRATION RATE: 18 BRPM | TEMPERATURE: 97.8 F

## 2022-07-13 DIAGNOSIS — G89.29 CHRONIC RIGHT-SIDED LOW BACK PAIN WITHOUT SCIATICA: ICD-10-CM

## 2022-07-13 DIAGNOSIS — M54.50 CHRONIC RIGHT-SIDED LOW BACK PAIN WITHOUT SCIATICA: ICD-10-CM

## 2022-07-13 DIAGNOSIS — E03.9 PRIMARY HYPOTHYROIDISM: ICD-10-CM

## 2022-07-13 DIAGNOSIS — F41.9 ANXIETY: Primary | ICD-10-CM

## 2022-07-13 PROCEDURE — 99213 OFFICE O/P EST LOW 20 MIN: CPT | Performed by: NURSE PRACTITIONER

## 2022-07-13 PROCEDURE — 3008F BODY MASS INDEX DOCD: CPT | Performed by: NURSE PRACTITIONER

## 2022-07-13 ASSESSMENT — PATIENT HEALTH QUESTIONNAIRE - PHQ9: SUM OF ALL RESPONSES TO PHQ9 QUESTIONS 1 & 2: 0

## 2022-07-13 NOTE — LETTER
[facility]  Preventive Exam Fax Back Request   Date:22     To:      FAX#:      From: Katie Fontana CRNP/Clinical Team  [facility] [place]  [address] [place] [address], [address]  [place]  [address]  Phone: [phone]    RE:  Maryjo Tovar (: 1970)     We are reaching out to you because our mutual patient, Maryjo Tovar (: 1970), reported they had the preventive procedure(s) indicated below performed at your facility:     • Cervical Cancer Screening (Pap Smear)  • Colorectal Cancer Screening (Colonoscopy, Pathology (if indicated) and Follow up     Please fax the most recent results to our office with this Cover Sheet.  (If no results are found, please check the appropriate box below)  ? - No results found  ? - Results attached    Please fax to: FAX# [phone]    THANK YOU FOR YOUR PARTNERSHIP IN   PROVIDING EXCELLENT CARE TO OUR PATIENTS!      This request is confidential and intended only for the use of the individual or entity to which it is addressed.  It may contain information that is privileged, confidential, and exempt from disclosure.  if the reader of this message is not the intended recipient, you are hereby notified that any dissemination, distribution, or copying of this communication is strictly prohibited.  If you believe you have received this communication in error, please notify us immediately at [phone].

## 2022-07-13 NOTE — PROGRESS NOTES
[facility] at [address]  [place] [address]  [phone]  Fax [phone]      Patient ID: Maryjo Tovar                              : 1970    Visit Date: 2022    Chief Complaint: Med Management         Patient ID: Maryjo Tovar is a 52 y.o. female.    Patient Active Problem List   Diagnosis   • Allergic rhinitis   • Anxiety   • Atopic dermatitis   • Family history of coronary artery disease   • Hemorrhoids, external   • Primary hypothyroidism   • Insomnia due to medical condition   • Polycystic ovarian disease   • Vitamin D deficiency   • Hypercholesterolemia   • Colon cancer screening   • Chronic right-sided low back pain without sciatica         Current Outpatient Medications:   •  ALPRAZolam (XANAX) 0.25 mg tablet, Take 1 tablet (0.25 mg total) by mouth 2 (two) times a day as needed for anxiety., Disp: 60 tablet, Rfl: 0  •  SYNTHROID 100 mcg tablet, TAKE 8 TABLETS BY MOUTH WEEKLY, Disp: , Rfl:     Allergies   Allergen Reactions   • Sulfa (Sulfonamide Antibiotics) Hives       Social History     Tobacco Use   • Smoking status: Never Smoker   • Smokeless tobacco: Never Used   Vaping Use   • Vaping Use: Never used   Substance Use Topics   • Alcohol use: Yes   • Drug use: Never       Health Maintenance   Topic Date Due   • Cervical Cancer Screening  2021   • Colonoscopy  10/12/2022 (Originally 2015)   • COVID-19 Vaccine (2 - Booster for Isabel series) 2022 (Originally 2021)   • Zoster Vaccine (1 of 2) 2023 (Originally 2020)   • Influenza Vaccine (1) 2022   • Breast Cancer Screening  2022   • Meningococcal ACWY  Aged Out   • HIB Vaccines  Aged Out   • IPV Vaccines  Aged Out   • HPV Vaccines  Aged Out   • Pneumococcal  Aged Out   • DTaP, Tdap, and Td Vaccines  Discontinued   • HIV Screening  Discontinued   • Hepatitis C Screening  Discontinued       HPI  Routine med check.  Had recent labs  "for endocrine.  Using Alprazolam PRN.  Uses sparingly.    Anxiety  Presents for follow-up visit. Symptoms include excessive worry and nervous/anxious behavior. Patient reports no chest pain, depressed mood, palpitations, panic, shortness of breath or suicidal ideas. Symptoms occur occasionally. The severity of symptoms is moderate.     Compliance with medications: using Alprazolam PRN. Treatment side effects: none.   Thyroid Problem  Presents for follow-up (hypothyroidism. Dr Solis follows) visit. Symptoms include anxiety. Patient reports no depressed mood, dry skin, fatigue, hair loss, hoarse voice, leg swelling, palpitations, weight gain or weight loss. The symptoms have been stable.       The following have been reviewed and updated as appropriate in this visit:   Allergies  Meds  Problems           Review of System  Review of Systems   Constitutional: Negative for fatigue, weight gain and weight loss.   HENT: Negative for hoarse voice.    Respiratory: Negative for shortness of breath.    Cardiovascular: Negative for chest pain and palpitations.   Psychiatric/Behavioral: Negative for suicidal ideas. The patient is nervous/anxious.        Objective     Vitals  Vitals:    07/13/22 1457   BP: 138/82   BP Location: Left upper arm   Patient Position: Sitting   Pulse: 73   Resp: 18   Temp: 36.6 °C (97.8 °F)   TempSrc: Temporal   SpO2: 97%   Weight: 74.7 kg (164 lb 9.6 oz)   Height: 1.575 m (5' 2.01\")     Body mass index is 30.1 kg/m².    Physical Exam  Physical Exam  Vitals reviewed.   Constitutional:       General: She is not in acute distress.     Appearance: Normal appearance. She is not diaphoretic.   Cardiovascular:      Rate and Rhythm: Normal rate and regular rhythm.      Heart sounds: No murmur heard.    No friction rub. No gallop.   Pulmonary:      Effort: Pulmonary effort is normal.      Breath sounds: Normal breath sounds. No wheezing, rhonchi or rales.   Musculoskeletal:      Right lower leg: No edema.    "   Left lower leg: No edema.   Neurological:      Mental Status: She is alert and oriented to person, place, and time.   Psychiatric:         Mood and Affect: Mood and affect normal.         Speech: Speech normal.         Behavior: Behavior normal.         Thought Content: Thought content does not include suicidal ideation. Thought content does not include suicidal plan.         Assessment/Plan     Problem List Items Addressed This Visit     Anxiety - Primary     Stable on PRN Alprazolam.  Continue med  Use sparingly  Aware of potential for habit forming qualities.  Follow up every 6 months.           Primary hypothyroidism     Dr Solis follows  Recent labs reviewed from 4/19/22 correspondence.  Pt remains stable on Synthroid 100mcg daily.           Chronic right-sided low back pain without sciatica     Continue to exercise and focus on core strength.  Stable.                     LUIS CARLOS Staley  7/14/2022

## 2022-07-14 ASSESSMENT — ENCOUNTER SYMPTOMS
WEIGHT GAIN: 0
PANIC: 0
FATIGUE: 0
WEIGHT LOSS: 0
NERVOUS/ANXIOUS: 1
SHORTNESS OF BREATH: 0
DEPRESSED MOOD: 0
DRY SKIN: 0
HAIR LOSS: 0
PALPITATIONS: 0
HOARSE VOICE: 0

## 2022-07-14 NOTE — ASSESSMENT & PLAN NOTE
Stable on PRN Alprazolam.  Continue med  Use sparingly  Aware of potential for habit forming qualities.  Follow up every 6 months.

## 2022-07-14 NOTE — ASSESSMENT & PLAN NOTE
Dr Solis follows  Recent labs reviewed from 4/19/22 correspondence.  Pt remains stable on Synthroid 100mcg daily.

## 2022-10-26 DIAGNOSIS — F41.9 ANXIETY: ICD-10-CM

## 2022-10-26 RX ORDER — ALPRAZOLAM 0.25 MG/1
0.25 TABLET ORAL 2 TIMES DAILY PRN
Qty: 60 TABLET | Refills: 0 | Status: SHIPPED | OUTPATIENT
Start: 2022-10-26 | End: 2023-01-26 | Stop reason: SDUPTHER

## 2022-10-26 NOTE — TELEPHONE ENCOUNTER
Medicine Refill Request    Last Office: 7/13/2022   Last Consult Visit: Visit date not found  Last Telemedicine Visit: Visit date not found    Next Appointment: Visit date not found      Current Outpatient Medications:     ALPRAZolam (XANAX) 0.25 mg tablet, Take 1 tablet (0.25 mg total) by mouth 2 (two) times a day as needed for anxiety., Disp: 60 tablet, Rfl: 0    SYNTHROID 100 mcg tablet, TAKE 8 TABLETS BY MOUTH WEEKLY, Disp: , Rfl:       BP Readings from Last 3 Encounters:   07/13/22 138/82   10/06/21 114/82   04/01/21 122/70       Recent Lab results:  Lab Results   Component Value Date    CHOL 198 04/17/2021   ,   Lab Results   Component Value Date    HDL 76 04/17/2021   ,   Lab Results   Component Value Date    LDLCALC 110 (H) 04/17/2021   ,   Lab Results   Component Value Date    TRIG 67 04/17/2021        Lab Results   Component Value Date    GLUCOSE 96 04/17/2021   , No results found for: HGBA1C      Lab Results   Component Value Date    CREATININE 0.83 04/17/2021       Lab Results   Component Value Date    TSH 2.48 06/04/2020

## 2022-10-27 ENCOUNTER — TELEPHONE (OUTPATIENT)
Dept: PRIMARY CARE | Facility: CLINIC | Age: 52
End: 2022-10-27

## 2022-10-27 DIAGNOSIS — G47.01 INSOMNIA DUE TO MEDICAL CONDITION: Primary | ICD-10-CM

## 2022-10-27 RX ORDER — ZOLPIDEM TARTRATE 10 MG/1
10 TABLET ORAL NIGHTLY PRN
Qty: 10 TABLET | Refills: 0 | Status: SHIPPED | OUTPATIENT
Start: 2022-10-27 | End: 2023-02-01 | Stop reason: ALTCHOICE

## 2022-10-27 NOTE — TELEPHONE ENCOUNTER
Pt called has been dealing with a sick cat. Is very upset and states she is having crying spells at work and wakes up in the middle of the night and just starts to cry and is unable to get back to sleep. Taking cats sickness very hard. Did take 2 1/2 tables of xanax last evening but woke up at 3 AM and started crying. Is there any thing else you could prescribe that would help? Please advise

## 2022-12-14 ENCOUNTER — OFFICE VISIT (OUTPATIENT)
Dept: PRIMARY CARE | Facility: CLINIC | Age: 52
End: 2022-12-14
Payer: COMMERCIAL

## 2022-12-14 VITALS
BODY MASS INDEX: 30.18 KG/M2 | HEART RATE: 67 BPM | RESPIRATION RATE: 18 BRPM | WEIGHT: 164 LBS | SYSTOLIC BLOOD PRESSURE: 118 MMHG | OXYGEN SATURATION: 99 % | TEMPERATURE: 98.2 F | HEIGHT: 62 IN | DIASTOLIC BLOOD PRESSURE: 82 MMHG

## 2022-12-14 DIAGNOSIS — J06.9 VIRAL UPPER RESPIRATORY TRACT INFECTION: Primary | ICD-10-CM

## 2022-12-14 PROCEDURE — 3008F BODY MASS INDEX DOCD: CPT | Performed by: NURSE PRACTITIONER

## 2022-12-14 PROCEDURE — 99213 OFFICE O/P EST LOW 20 MIN: CPT | Performed by: NURSE PRACTITIONER

## 2022-12-14 PROCEDURE — 87637 SARSCOV2&INF A&B&RSV AMP PRB: CPT | Performed by: NURSE PRACTITIONER

## 2022-12-14 ASSESSMENT — ENCOUNTER SYMPTOMS
SWOLLEN GLANDS: 1
COUGH: 0
WHEEZING: 0
SINUS PAIN: 0
RHINORRHEA: 0
HEADACHES: 0
SORE THROAT: 1

## 2022-12-14 ASSESSMENT — PATIENT HEALTH QUESTIONNAIRE - PHQ9: SUM OF ALL RESPONSES TO PHQ9 QUESTIONS 1 & 2: 0

## 2022-12-14 ASSESSMENT — PAIN SCALES - GENERAL: PAINLEVEL: 0-NO PAIN

## 2022-12-14 NOTE — PROGRESS NOTES
[facility] at [address]  [place] [address]  [phone]  Fax [phone]      Patient ID: Maryjo Tovar                              : 1970    Visit Date: 2022    Chief Complaint: Nasal Congestion         Patient ID: Maryjo Tovar is a 52 y.o. female.    Patient Active Problem List   Diagnosis   • Allergic rhinitis   • Anxiety   • Atopic dermatitis   • Family history of coronary artery disease   • Hemorrhoids, external   • Primary hypothyroidism   • Insomnia due to medical condition   • Polycystic ovarian disease   • Vitamin D deficiency   • Hypercholesterolemia   • Colon cancer screening   • Chronic right-sided low back pain without sciatica   • Viral upper respiratory tract infection         Current Outpatient Medications:   •  ALPRAZolam (XANAX) 0.25 mg tablet, Take 1 tablet (0.25 mg total) by mouth 2 (two) times a day as needed for anxiety., Disp: 60 tablet, Rfl: 0  •  SYNTHROID 100 mcg tablet, TAKE 8 TABLETS BY MOUTH WEEKLY, Disp: , Rfl:   •  zolpidem (AMBIEN) 10 mg tablet, Take 1 tablet (10 mg total) by mouth nightly as needed for sleep., Disp: 10 tablet, Rfl: 0    Allergies   Allergen Reactions   • Sulfa (Sulfonamide Antibiotics) Hives       Social History     Tobacco Use   • Smoking status: Never   • Smokeless tobacco: Never   Vaping Use   • Vaping Use: Never used   Substance Use Topics   • Alcohol use: Yes   • Drug use: Never       Health Maintenance   Topic Date Due   • Hepatitis B Vaccines (1 of 3 - 3-dose series) Never done   • Influenza Vaccine (1) 2022   • Breast Cancer Screening  2022   • COVID-19 Vaccine (2 - Booster for Isabel series) 2022 (Originally 2021)   • Zoster Vaccine (1 of 2) 2023 (Originally 2020)   • Depression Screening  2023   • Colorectal Cancer Screening  2025   • Cervical Cancer Screening  2026   • Meningococcal ACWY  Aged Out   • HIB Vaccines  Aged  "Out   • IPV Vaccines  Aged Out   • HPV Vaccines  Aged Out   • Pneumococcal  Aged Out   • DTaP, Tdap, and Td Vaccines  Discontinued   • HIV Screening  Discontinued   • Hepatitis C Screening  Discontinued       HPI  Daughter sick all week  Mild symptoms  Would like to be swabbed for COVID/flu    URI   This is a new problem. The current episode started yesterday. The problem has been unchanged. There has been no fever. Associated symptoms include congestion, a sore throat and swollen glands. Pertinent negatives include no chest pain, coughing, ear pain, headaches, rhinorrhea, sinus pain, sneezing or wheezing. She has tried NSAIDs for the symptoms. The treatment provided mild relief.       The following have been reviewed and updated as appropriate in this visit:          Review of System  Review of Systems   HENT: Positive for congestion and sore throat. Negative for ear pain, rhinorrhea, sinus pain and sneezing.    Respiratory: Negative for cough and wheezing.    Cardiovascular: Negative for chest pain.   Neurological: Negative for headaches.       Objective     Vitals  Vitals:    12/14/22 1522   BP: 118/82   BP Location: Left upper arm   Patient Position: Sitting   Pulse: 67   Resp: 18   Temp: 36.8 °C (98.2 °F)   TempSrc: Temporal   SpO2: 99%   Weight: 74.4 kg (164 lb)   Height: 1.575 m (5' 2.01\")     Body mass index is 29.99 kg/m².      Physical Exam  Vitals reviewed.   Constitutional:       General: She is not in acute distress.     Appearance: Normal appearance. She is not ill-appearing, toxic-appearing or diaphoretic.   HENT:      Right Ear: Tympanic membrane, ear canal and external ear normal.      Left Ear: Tympanic membrane, ear canal and external ear normal.      Nose: Nose normal.      Mouth/Throat:      Pharynx: Pharyngeal swelling and posterior oropharyngeal erythema present. No oropharyngeal exudate or uvula swelling.   Cardiovascular:      Rate and Rhythm: Normal rate and regular rhythm.      Heart " sounds: No murmur heard.    No friction rub. No gallop.   Pulmonary:      Effort: Pulmonary effort is normal.      Breath sounds: Normal breath sounds. No wheezing, rhonchi or rales.   Musculoskeletal:      Cervical back: Neck supple. Tenderness present. No rigidity.   Lymphadenopathy:      Cervical: Cervical adenopathy present.   Neurological:      Mental Status: She is alert and oriented to person, place, and time.         Assessment/Plan     Problem List Items Addressed This Visit     Viral upper respiratory tract infection - Primary     COVID/flu/RSV swab obtained.  Ibuprofen, rest, fluids, salt water gargles for now  Quarantine for now.  Follow up if symptoms worsen/persist.           Relevant Orders    COVID- 19 PCR Symptomatic (includes FLU A/B & RSV) - [facility]           LUIS CARLOS Staley  12/14/2022

## 2022-12-14 NOTE — ASSESSMENT & PLAN NOTE
COVID/flu/RSV swab obtained.  Ibuprofen, rest, fluids, salt water gargles for now  Quarantine for now.  Follow up if symptoms worsen/persist.

## 2022-12-15 LAB
FLUAV RNA SPEC QL NAA+PROBE: NEGATIVE
FLUBV RNA SPEC QL NAA+PROBE: NEGATIVE
RSV RNA SPEC QL NAA+PROBE: NEGATIVE
SARS-COV-2 RNA RESP QL NAA+PROBE: NEGATIVE

## 2023-01-26 DIAGNOSIS — F41.9 ANXIETY: ICD-10-CM

## 2023-01-26 RX ORDER — ALPRAZOLAM 0.25 MG/1
0.25 TABLET ORAL 2 TIMES DAILY PRN
Qty: 60 TABLET | Refills: 0 | Status: SHIPPED | OUTPATIENT
Start: 2023-01-26 | End: 2023-04-10 | Stop reason: SDUPTHER

## 2023-01-26 NOTE — TELEPHONE ENCOUNTER
Medicine Refill Request    Last Office: 12/14/2022   Last Consult Visit: Visit date not found  Last Telemedicine Visit: Visit date not found    Next Appointment: 2/1/2023      Current Outpatient Medications:   •  ALPRAZolam (XANAX) 0.25 mg tablet, Take 1 tablet (0.25 mg total) by mouth 2 (two) times a day as needed for anxiety., Disp: 60 tablet, Rfl: 0  •  SYNTHROID 100 mcg tablet, TAKE 8 TABLETS BY MOUTH WEEKLY, Disp: , Rfl:   •  zolpidem (AMBIEN) 10 mg tablet, Take 1 tablet (10 mg total) by mouth nightly as needed for sleep., Disp: 10 tablet, Rfl: 0      BP Readings from Last 3 Encounters:   12/14/22 118/82   07/13/22 138/82   10/06/21 114/82       Recent Lab results:  Lab Results   Component Value Date    CHOL 198 04/17/2021   ,   Lab Results   Component Value Date    HDL 76 04/17/2021   ,   Lab Results   Component Value Date    LDLCALC 110 (H) 04/17/2021   ,   Lab Results   Component Value Date    TRIG 67 04/17/2021        Lab Results   Component Value Date    GLUCOSE 96 04/17/2021   , No results found for: HGBA1C      Lab Results   Component Value Date    CREATININE 0.83 04/17/2021       Lab Results   Component Value Date    TSH 2.48 06/04/2020

## 2023-02-01 ENCOUNTER — OFFICE VISIT (OUTPATIENT)
Dept: PRIMARY CARE | Facility: CLINIC | Age: 53
End: 2023-02-01
Payer: COMMERCIAL

## 2023-02-01 VITALS
SYSTOLIC BLOOD PRESSURE: 128 MMHG | HEART RATE: 67 BPM | HEIGHT: 62 IN | TEMPERATURE: 97.6 F | BODY MASS INDEX: 29.81 KG/M2 | WEIGHT: 162 LBS | DIASTOLIC BLOOD PRESSURE: 82 MMHG | RESPIRATION RATE: 18 BRPM | OXYGEN SATURATION: 98 %

## 2023-02-01 DIAGNOSIS — E78.00 HYPERCHOLESTEROLEMIA: ICD-10-CM

## 2023-02-01 DIAGNOSIS — E55.9 VITAMIN D DEFICIENCY: ICD-10-CM

## 2023-02-01 DIAGNOSIS — Z12.31 BREAST CANCER SCREENING BY MAMMOGRAM: ICD-10-CM

## 2023-02-01 DIAGNOSIS — E03.9 PRIMARY HYPOTHYROIDISM: Primary | ICD-10-CM

## 2023-02-01 DIAGNOSIS — F41.9 ANXIETY: ICD-10-CM

## 2023-02-01 PROBLEM — J06.9 VIRAL UPPER RESPIRATORY TRACT INFECTION: Status: RESOLVED | Noted: 2022-12-14 | Resolved: 2023-02-01

## 2023-02-01 PROCEDURE — 99214 OFFICE O/P EST MOD 30 MIN: CPT | Performed by: NURSE PRACTITIONER

## 2023-02-01 PROCEDURE — 3008F BODY MASS INDEX DOCD: CPT | Performed by: NURSE PRACTITIONER

## 2023-02-01 ASSESSMENT — ENCOUNTER SYMPTOMS
WEIGHT LOSS: 0
INSOMNIA: 1
NERVOUS/ANXIOUS: 1
HAIR LOSS: 0
DRY SKIN: 0
FATIGUE: 0
HOARSE VOICE: 0
PALPITATIONS: 0
SHORTNESS OF BREATH: 0
WEIGHT GAIN: 0

## 2023-02-01 ASSESSMENT — PATIENT HEALTH QUESTIONNAIRE - PHQ9: SUM OF ALL RESPONSES TO PHQ9 QUESTIONS 1 & 2: 0

## 2023-02-01 NOTE — PROGRESS NOTES
[facility] at [address]  [place] [address]  [phone]  Fax [phone]      Patient ID: Maryjo Tovar                              : 1970    Visit Date: 2023    Chief Complaint: Follow-up         Patient ID: Maryjo Tovar is a 52 y.o. female.    Patient Active Problem List   Diagnosis   • Allergic rhinitis   • Anxiety   • Atopic dermatitis   • Family history of coronary artery disease   • Hemorrhoids, external   • Primary hypothyroidism   • Insomnia due to medical condition   • Polycystic ovarian disease   • Vitamin D deficiency   • Hypercholesterolemia   • Colon cancer screening   • Chronic right-sided low back pain without sciatica         Current Outpatient Medications:   •  ALPRAZolam (XANAX) 0.25 mg tablet, Take 1 tablet (0.25 mg total) by mouth 2 (two) times a day as needed for anxiety., Disp: 60 tablet, Rfl: 0  •  SYNTHROID 100 mcg tablet, TAKE 8 TABLETS BY MOUTH WEEKLY, Disp: , Rfl:     Allergies   Allergen Reactions   • Sulfa (Sulfonamide Antibiotics) Hives       Social History     Tobacco Use   • Smoking status: Never   • Smokeless tobacco: Never   Vaping Use   • Vaping Use: Never used   Substance Use Topics   • Alcohol use: Yes   • Drug use: Never       Health Maintenance   Topic Date Due   • COVID-19 Vaccine (2 - Booster for Isabel series) 2021   • Breast Cancer Screening  2022   • Influenza Vaccine (1) 2023 (Originally 2022)   • Zoster Vaccine (1 of 2) 2023 (Originally 2020)   • Depression Screening  2024   • Colorectal Cancer Screening  2025   • Cervical Cancer Screening  2026   • Meningococcal ACWY  Aged Out   • HIB Vaccines  Aged Out   • IPV Vaccines  Aged Out   • HPV Vaccines  Aged Out   • Pneumococcal  Aged Out   • DTaP, Tdap, and Td Vaccines  Discontinued   • Hepatitis B Vaccines  Discontinued   • HIV Screening  Discontinued   • Hepatitis C Screening   "Discontinued       HPI  Routine med check.    Anxiety  Presents for follow-up visit. Symptoms include excessive worry, insomnia and nervous/anxious behavior. Patient reports no chest pain, palpitations, shortness of breath or suicidal ideas. Symptoms occur occasionally. The severity of symptoms is moderate. The quality of sleep is fair.     Compliance with medications: PRN Xanax. Treatment side effects: none.   Thyroid Problem  Presents for follow-up (hypothyroidism on Synthroid) visit. Symptoms include anxiety. Patient reports no dry skin, fatigue, hair loss, hoarse voice, leg swelling, palpitations, weight gain or weight loss. The symptoms have been stable. Her past medical history is significant for hyperlipidemia.   Hyperlipidemia  This is a chronic problem. The current episode started more than 1 year ago. Lipid results: borderline high. Exacerbating diseases include hypothyroidism. Pertinent negatives include no chest pain or shortness of breath. Current antihyperlipidemic treatment includes diet change and exercise. There are no compliance problems.        The following have been reviewed and updated as appropriate in this visit:          Review of System  Review of Systems   Constitutional: Negative for fatigue, weight gain and weight loss.   HENT: Negative for hoarse voice.    Respiratory: Negative for shortness of breath.    Cardiovascular: Negative for chest pain and palpitations.   Psychiatric/Behavioral: Negative for suicidal ideas. The patient is nervous/anxious and has insomnia.        Objective     Vitals  Vitals:    02/01/23 1556   BP: 128/82   BP Location: Left upper arm   Patient Position: Sitting   Pulse: 67   Resp: 18   Temp: 36.4 °C (97.6 °F)   TempSrc: Temporal   SpO2: 98%   Weight: 73.5 kg (162 lb)   Height: 1.575 m (5' 2\")     Body mass index is 29.63 kg/m².      Physical Exam  Vitals reviewed.   Constitutional:       General: She is not in acute distress.     Appearance: Normal appearance. " She is not diaphoretic.   Neck:      Thyroid: No thyromegaly.   Cardiovascular:      Rate and Rhythm: Normal rate and regular rhythm.      Heart sounds: No murmur heard.    No friction rub. No gallop.   Pulmonary:      Effort: Pulmonary effort is normal.      Breath sounds: Normal breath sounds. No wheezing, rhonchi or rales.   Musculoskeletal:      Cervical back: Neck supple. No rigidity or tenderness.   Lymphadenopathy:      Cervical: No cervical adenopathy.   Neurological:      Mental Status: She is alert and oriented to person, place, and time.   Psychiatric:         Mood and Affect: Mood and affect normal.         Speech: Speech normal.         Behavior: Behavior normal.         Thought Content: Thought content does not include suicidal ideation. Thought content does not include suicidal plan.         Assessment/Plan     Problem List Items Addressed This Visit     Anxiety     Stable.  PRN Alprazolam   Uses sparingly.         Primary hypothyroidism - Primary     Check TSH  Has been stable on current synthroid dose.  Endocrine follows yearly.         Relevant Orders    TSH 3rd Generation    Vitamin D deficiency     Check D level         Relevant Orders    Vitamin D 25 hydroxy    Hypercholesterolemia     Labs ordered.  Lowfat low chol diet  Regular exercise program         Relevant Orders    CBC and Differential    Comprehensive metabolic panel    Lipid panel   Other Visit Diagnoses     Breast cancer screening by mammogram        Relevant Orders    BI SCREENING MAMMOGRAM BILATERAL(TOMOSYNTHESIS)              LUIS CARLOS Staley  2/1/2023

## 2023-04-04 ENCOUNTER — TELEPHONE (OUTPATIENT)
Dept: PRIMARY CARE | Facility: CLINIC | Age: 53
End: 2023-04-04

## 2023-04-04 DIAGNOSIS — R53.82 CHRONIC FATIGUE: ICD-10-CM

## 2023-04-04 DIAGNOSIS — N95.1 PERIMENOPAUSAL: Primary | ICD-10-CM

## 2023-04-04 LAB
25(OH)D3+25(OH)D2 SERPL-MCNC: 45.5 NG/ML (ref 30–100)
ALBUMIN SERPL-MCNC: 4.2 G/DL (ref 3.8–4.9)
ALBUMIN/GLOB SERPL: 1.7 {RATIO} (ref 1.2–2.2)
ALP SERPL-CCNC: 70 IU/L (ref 44–121)
ALT SERPL-CCNC: 18 IU/L (ref 0–32)
AST SERPL-CCNC: 18 IU/L (ref 0–40)
BASOPHILS # BLD AUTO: 0.1 X10E3/UL (ref 0–0.2)
BASOPHILS NFR BLD AUTO: 1 %
BILIRUB SERPL-MCNC: 0.5 MG/DL (ref 0–1.2)
BUN SERPL-MCNC: 13 MG/DL (ref 6–24)
BUN/CREAT SERPL: 16 (ref 9–23)
CALCIUM SERPL-MCNC: 9.5 MG/DL (ref 8.7–10.2)
CHLORIDE SERPL-SCNC: 102 MMOL/L (ref 96–106)
CHOLEST SERPL-MCNC: 233 MG/DL (ref 100–199)
CO2 SERPL-SCNC: 25 MMOL/L (ref 20–29)
CREAT SERPL-MCNC: 0.81 MG/DL (ref 0.57–1)
EGFRCR SERPLBLD CKD-EPI 2021: 87 ML/MIN/1.73
EOSINOPHIL # BLD AUTO: 0.7 X10E3/UL (ref 0–0.4)
EOSINOPHIL NFR BLD AUTO: 12 %
ERYTHROCYTE [DISTWIDTH] IN BLOOD BY AUTOMATED COUNT: 12.2 % (ref 11.7–15.4)
GLOBULIN SER CALC-MCNC: 2.5 G/DL (ref 1.5–4.5)
GLUCOSE SERPL-MCNC: 86 MG/DL (ref 70–99)
HCT VFR BLD AUTO: 40.7 % (ref 34–46.6)
HDLC SERPL-MCNC: 72 MG/DL
HGB BLD-MCNC: 13.8 G/DL (ref 11.1–15.9)
IMM GRANULOCYTES # BLD AUTO: 0 X10E3/UL (ref 0–0.1)
IMM GRANULOCYTES NFR BLD AUTO: 0 %
LDLC SERPL CALC-MCNC: 146 MG/DL (ref 0–99)
LYMPHOCYTES # BLD AUTO: 1.7 X10E3/UL (ref 0.7–3.1)
LYMPHOCYTES NFR BLD AUTO: 29 %
MCH RBC QN AUTO: 31.2 PG (ref 26.6–33)
MCHC RBC AUTO-ENTMCNC: 33.9 G/DL (ref 31.5–35.7)
MCV RBC AUTO: 92 FL (ref 79–97)
MONOCYTES # BLD AUTO: 0.5 X10E3/UL (ref 0.1–0.9)
MONOCYTES NFR BLD AUTO: 8 %
NEUTROPHILS # BLD AUTO: 3 X10E3/UL (ref 1.4–7)
NEUTROPHILS NFR BLD AUTO: 50 %
PLATELET # BLD AUTO: 201 X10E3/UL (ref 150–450)
POTASSIUM SERPL-SCNC: 4.3 MMOL/L (ref 3.5–5.2)
PROT SERPL-MCNC: 6.7 G/DL (ref 6–8.5)
RBC # BLD AUTO: 4.42 X10E6/UL (ref 3.77–5.28)
SODIUM SERPL-SCNC: 140 MMOL/L (ref 134–144)
TRIGL SERPL-MCNC: 89 MG/DL (ref 0–149)
TSH SERPL DL<=0.005 MIU/L-ACNC: 0.06 UIU/ML (ref 0.45–4.5)
VLDLC SERPL CALC-MCNC: 15 MG/DL (ref 5–40)
WBC # BLD AUTO: 6 X10E3/UL (ref 3.4–10.8)

## 2023-04-04 NOTE — TELEPHONE ENCOUNTER
----- Message from LUIS CARLOS Staely sent at 4/4/2023  6:42 AM EDT -----  Please call pt and verify thyroid med dose. I will decrease her dose due to TSH reading.

## 2023-04-04 NOTE — TELEPHONE ENCOUNTER
Called [facility] to add on FSH/LH, Ferritin and Iron/ TIBC  They will send specimen request to see if enough blood is available

## 2023-04-04 NOTE — TELEPHONE ENCOUNTER
Called pt and discussed lab results. She is aware of low thyroid level. She currently takes Levothyroxine 125mcg daily. Dr Lowell Solis is her Endocrinologist and prefers he manage her medication. I will fax over her lab results to his office.   Pt asking if Iron level can be added to her blood work from yesterday as well as hormone panel. She doesn't feel well, hair in thinning. Not sure if it's due to thyroid, hormones or iron deficiency.    Please advise if additional labs can be ordered

## 2023-04-05 LAB
FERRITIN SERPL-MCNC: 84 NG/ML (ref 15–150)
FSH SERPL-ACNC: 75.2 MIU/ML
IRON SATN MFR SERPL: 27 % (ref 15–55)
IRON SERPL-MCNC: 83 UG/DL (ref 27–159)
LH SERPL-ACNC: 45.7 MIU/ML
TIBC SERPL-MCNC: 310 UG/DL (ref 250–450)
UIBC SERPL-MCNC: 227 UG/DL (ref 131–425)

## 2023-04-10 DIAGNOSIS — F41.9 ANXIETY: ICD-10-CM

## 2023-04-10 RX ORDER — ALPRAZOLAM 0.25 MG/1
0.25 TABLET ORAL 2 TIMES DAILY PRN
Qty: 60 TABLET | Refills: 0 | Status: SHIPPED | OUTPATIENT
Start: 2023-04-10 | End: 2023-05-02 | Stop reason: SDUPTHER

## 2023-04-10 NOTE — TELEPHONE ENCOUNTER
Medicine Refill Request    Last Office: 2/1/2023   Last Consult Visit: Visit date not found  Last Telemedicine Visit: Visit date not found    Next Appointment: 5/2/2023      Current Outpatient Medications:   •  ALPRAZolam (XANAX) 0.25 mg tablet, Take 1 tablet (0.25 mg total) by mouth 2 (two) times a day as needed for anxiety., Disp: 60 tablet, Rfl: 0  •  SYNTHROID 100 mcg tablet, TAKE 8 TABLETS BY MOUTH WEEKLY, Disp: , Rfl:       BP Readings from Last 3 Encounters:   02/01/23 128/82   12/14/22 118/82   07/13/22 138/82       Recent Lab results:  Lab Results   Component Value Date    CHOL 233 (H) 04/03/2023   ,   Lab Results   Component Value Date    HDL 72 04/03/2023   ,   Lab Results   Component Value Date    LDLCALC 146 (H) 04/03/2023   ,   Lab Results   Component Value Date    TRIG 89 04/03/2023        Lab Results   Component Value Date    GLUCOSE 86 04/03/2023   , No results found for: HGBA1C      Lab Results   Component Value Date    CREATININE 0.81 04/03/2023       Lab Results   Component Value Date    TSH 0.064 (L) 04/03/2023

## 2023-04-26 LAB — SPECIMEN STATUS: NORMAL

## 2023-05-02 ENCOUNTER — OFFICE VISIT (OUTPATIENT)
Dept: PRIMARY CARE | Facility: CLINIC | Age: 53
End: 2023-05-02
Payer: COMMERCIAL

## 2023-05-02 VITALS
DIASTOLIC BLOOD PRESSURE: 82 MMHG | HEIGHT: 62 IN | RESPIRATION RATE: 18 BRPM | SYSTOLIC BLOOD PRESSURE: 140 MMHG | BODY MASS INDEX: 29.88 KG/M2 | TEMPERATURE: 98.3 F | HEART RATE: 78 BPM | OXYGEN SATURATION: 99 % | WEIGHT: 162.4 LBS

## 2023-05-02 DIAGNOSIS — F41.9 ANXIETY: Primary | ICD-10-CM

## 2023-05-02 PROCEDURE — 99213 OFFICE O/P EST LOW 20 MIN: CPT | Performed by: NURSE PRACTITIONER

## 2023-05-02 PROCEDURE — 3008F BODY MASS INDEX DOCD: CPT | Performed by: NURSE PRACTITIONER

## 2023-05-02 RX ORDER — ALPRAZOLAM 0.25 MG/1
0.25 TABLET ORAL 2 TIMES DAILY PRN
Qty: 60 TABLET | Refills: 0 | Status: SHIPPED | OUTPATIENT
Start: 2023-05-02 | End: 2023-09-05 | Stop reason: SDUPTHER

## 2023-05-02 ASSESSMENT — PAIN SCALES - GENERAL: PAINLEVEL: 0-NO PAIN

## 2023-05-02 ASSESSMENT — ENCOUNTER SYMPTOMS
DEPRESSED MOOD: 0
INSOMNIA: 1
NERVOUS/ANXIOUS: 1
PALPITATIONS: 1
SHORTNESS OF BREATH: 0

## 2023-05-02 NOTE — PROGRESS NOTES
[facility] at [address]  [place] [address]  [phone]  Fax [phone]      Patient ID: Maryjo Tovar                              : 1970    Visit Date: 2023    Chief Complaint: Med Management         Patient ID: Maryjo Tovar is a 52 y.o. female.    Patient Active Problem List   Diagnosis   • Allergic rhinitis   • Anxiety   • Atopic dermatitis   • Family history of coronary artery disease   • Hemorrhoids, external   • Primary hypothyroidism   • Insomnia due to medical condition   • Polycystic ovarian disease   • Vitamin D deficiency   • Hypercholesterolemia   • Colon cancer screening   • Chronic right-sided low back pain without sciatica         Current Outpatient Medications:   •  ALPRAZolam (XANAX) 0.25 mg tablet, Take 1 tablet (0.25 mg total) by mouth 2 (two) times a day as needed for anxiety., Disp: 60 tablet, Rfl: 0  •  SYNTHROID 100 mcg tablet, TAKE 8 TABLETS BY MOUTH WEEKLY, Disp: , Rfl:     Allergies   Allergen Reactions   • Sulfa (Sulfonamide Antibiotics) Hives       Social History     Tobacco Use   • Smoking status: Never   • Smokeless tobacco: Never   Vaping Use   • Vaping status: Never Used   Substance Use Topics   • Alcohol use: Yes   • Drug use: Never       Health Maintenance   Topic Date Due   • COVID-19 Vaccine (2 - Booster for Isabel series) 2021   • Zoster Vaccine (1 of 2) 2023 (Originally 2020)   • Influenza Vaccine (Season Ended) 2023   • Depression Screening  2024   • Breast Cancer Screening  2024   • Colorectal Cancer Screening  2025   • Cervical Cancer Screening  2026   • Meningococcal ACWY  Aged Out   • HIB Vaccines  Aged Out   • IPV Vaccines  Aged Out   • HPV Vaccines  Aged Out   • Pneumococcal  Aged Out   • DTaP, Tdap, and Td Vaccines  Discontinued   • Hepatitis B Vaccines  Discontinued   • HIV Screening  Discontinued   • Hepatitis C Screening  Discontinued  "      HPI  Anxiety med follow up.  Was living with her brother for weeks after he had heart surgery.  She admits she took 2 Xanax nighty so she could sleep.  Has run out now.  Increased stress caring for her aging parents and her brother recently.    Anxiety  Presents for follow-up visit. Symptoms include excessive worry, insomnia, nervous/anxious behavior and palpitations. Patient reports no chest pain, depressed mood, shortness of breath or suicidal ideas. Symptoms occur most days. The severity of symptoms is moderate. The quality of sleep is fair. Nighttime awakenings: several (using Xanax PRN).     Treatment side effects: none.       The following have been reviewed and updated as appropriate in this visit:   Allergies  Meds  Problems         Review of System  Review of Systems   Respiratory: Negative for shortness of breath.    Cardiovascular: Positive for palpitations. Negative for chest pain.   Psychiatric/Behavioral: Negative for suicidal ideas. The patient is nervous/anxious and has insomnia.        Objective     Vitals  Vitals:    05/02/23 1544   BP: 140/82   BP Location: Left upper arm   Patient Position: Sitting   Pulse: 78   Resp: 18   Temp: 36.8 °C (98.3 °F)   TempSrc: Temporal   SpO2: 99%   Weight: 73.7 kg (162 lb 6.4 oz)   Height: 1.575 m (5' 2.01\")     Body mass index is 29.7 kg/m².      Physical Exam  Vitals reviewed.   Constitutional:       General: She is not in acute distress.     Appearance: Normal appearance. She is not diaphoretic.   Cardiovascular:      Rate and Rhythm: Normal rate and regular rhythm.      Heart sounds: No murmur heard.     No friction rub. No gallop.   Pulmonary:      Effort: Pulmonary effort is normal.      Breath sounds: Normal breath sounds. No wheezing, rhonchi or rales.   Musculoskeletal:      Left lower leg: No edema.   Neurological:      Mental Status: She is alert and oriented to person, place, and time.   Psychiatric:         Mood and Affect: Mood and affect " normal.         Speech: Speech normal.         Behavior: Behavior normal.         Thought Content: Thought content does not include suicidal ideation. Thought content does not include suicidal plan.         Assessment/Plan     Problem List Items Addressed This Visit     Anxiety - Primary     Used all 60 Xanax last month due to circumstances caring for her brother and parents.  New Rx given  Needs to make it last 3 months  Will give a tricyclic or SSRI if symptoms are regular.  Pt states understanding.         Relevant Medications    ALPRAZolam (XANAX) 0.25 mg tablet           LUIS CARLOS Staley  5/2/2023

## 2023-05-02 NOTE — ASSESSMENT & PLAN NOTE
Used all 60 Xanax last month due to circumstances caring for her brother and parents.  New Rx given  Needs to make it last 3 months  Will give a tricyclic or SSRI if symptoms are regular.  Pt states understanding.

## 2023-05-25 ENCOUNTER — TELEMEDICINE (OUTPATIENT)
Dept: PRIMARY CARE | Facility: CLINIC | Age: 53
End: 2023-05-25
Payer: COMMERCIAL

## 2023-05-25 DIAGNOSIS — M54.50 CHRONIC BILATERAL LOW BACK PAIN WITHOUT SCIATICA: Primary | ICD-10-CM

## 2023-05-25 DIAGNOSIS — M54.2 NECK PAIN: ICD-10-CM

## 2023-05-25 DIAGNOSIS — G89.29 CHRONIC BILATERAL LOW BACK PAIN WITHOUT SCIATICA: Primary | ICD-10-CM

## 2023-05-25 PROCEDURE — 99212 OFFICE O/P EST SF 10 MIN: CPT | Mod: 95 | Performed by: NURSE PRACTITIONER

## 2023-05-25 ASSESSMENT — ENCOUNTER SYMPTOMS
BACK PAIN: 1
BOWEL INCONTINENCE: 0
TINGLING: 0
LEG PAIN: 0
WEAKNESS: 0
NUMBNESS: 0

## 2023-05-25 NOTE — ASSESSMENT & PLAN NOTE
Check X-ray  To consider PT course  Ok to use Aleve PRN  Good body mechanics  Stretches/ home exercises

## 2023-05-25 NOTE — PROGRESS NOTES
Verification of Patient Location:  The patient affirms they are currently located in the following state: [place]    Request for Consent:    Audio and Video Encounter   Hello, my name is LUIS CARLOS Staley.  Before we proceed, can you please verify your identification by telling me your full name and date of birth?  Can you tell me who is in the room with you?    You and I are about to have a telemedicine check-in or visit because you have requested it.  This is a live video-conference.  I am a real person, speaking to you in real time.  There is no one else with me on the video-conference. I am not recording this conversation and I am asking you not to record it.  This telemedicine visit will be billed to your health insurance or you, if you are self-insured.  You understand you will be responsible for any copayments or coinsurances that apply to your telemedicine visit.  Communication platform used for this encounter:  Roadmap Video Visit (Epic Video Client)       Before starting our telemedicine visit, I am required to get your consent for this virtual check-in or visit by telemedicine. Do you consent?      Patient Response to Request for Consent:  Yes    Patient Active Problem List   Diagnosis   • Allergic rhinitis   • Anxiety   • Atopic dermatitis   • Family history of coronary artery disease   • Hemorrhoids, external   • Primary hypothyroidism   • Insomnia due to medical condition   • Polycystic ovarian disease   • Vitamin D deficiency   • Hypercholesterolemia   • Colon cancer screening   • Chronic bilateral low back pain without sciatica   • Neck pain     Current Outpatient Medications on File Prior to Visit   Medication Sig Dispense Refill   • ALPRAZolam (XANAX) 0.25 mg tablet Take 1 tablet (0.25 mg total) by mouth 2 (two) times a day as needed for anxiety. 60 tablet 0   • SYNTHROID 100 mcg tablet TAKE 8 TABLETS BY MOUTH WEEKLY       No current facility-administered medications on file prior to visit.      Allergies   Allergen Reactions   • Sulfa (Sulfonamide Antibiotics) Hives     Social History     Tobacco Use   • Smoking status: Never   • Smokeless tobacco: Never   Vaping Use   • Vaping status: Never Used   Substance Use Topics   • Alcohol use: Yes   • Drug use: Never     Health Maintenance   Topic Date Due   • COVID-19 Vaccine (2 - Booster for Isabel series) 05/09/2021   • Zoster Vaccine (1 of 2) 07/13/2023 (Originally 5/13/2020)   • Influenza Vaccine (Season Ended) 08/01/2023   • Depression Screening  02/01/2024   • Breast Cancer Screening  03/27/2024   • Colorectal Cancer Screening  03/11/2025   • Cervical Cancer Screening  12/02/2026   • Meningococcal ACWY  Aged Out   • HIB Vaccines  Aged Out   • IPV Vaccines  Aged Out   • HPV Vaccines  Aged Out   • Pneumococcal  Aged Out   • DTaP, Tdap, and Td Vaccines  Discontinued   • Hepatitis B Vaccines  Discontinued   • HIV Screening  Discontinued   • Hepatitis C Screening  Discontinued       Visit Documentation:  Subjective     Patient ID: Maryjo Tovar is a 53 y.o. female.  1970      Would like to have her spine checked  Has recurrent /chronic low back and neck pains often.    Back Pain  This is a chronic problem. The current episode started more than 1 month ago. The problem occurs intermittently. The problem is unchanged. The pain is present in the lumbar spine (c spine). Pain severity now: mild to moderate. The symptoms are aggravated by position and bending. Pertinent negatives include no bladder incontinence, bowel incontinence, chest pain, leg pain, numbness, tingling or weakness. Risk factors include lack of exercise. She has tried chiropractic manipulation and NSAIDs for the symptoms. The treatment provided moderate relief.       The following have been reviewed and updated as appropriate in this visit:   Allergies  Meds  Problems       Review of Systems   Cardiovascular: Negative for chest pain.   Gastrointestinal: Negative for bowel incontinence.    Genitourinary: Negative for bladder incontinence.   Musculoskeletal: Positive for back pain.   Neurological: Negative for tingling, weakness and numbness.       Physical Exam  Constitutional:       General: She is not in acute distress.     Appearance: Normal appearance. She is not ill-appearing, toxic-appearing or diaphoretic.   Pulmonary:      Effort: Pulmonary effort is normal. No respiratory distress.   Neurological:      Mental Status: She is alert and oriented to person, place, and time.   Psychiatric:         Mood and Affect: Mood and affect normal.         Speech: Speech normal.         Behavior: Behavior normal.         Assessment/Plan   Diagnoses and all orders for this visit:    Chronic bilateral low back pain without sciatica (Primary)  Assessment & Plan:  X-ray L spine  To consider PT course  Ok to use Aleve PRN  Heat/ ICE ok.    Orders:  -     X-RAY LUMBAR SPINE COMPLETE 4+ VIEWS; Future    Neck pain  Assessment & Plan:  Check X-ray  To consider PT course  Ok to use Aleve PRN  Good body mechanics  Stretches/ home exercises    Orders:  -     X-RAY CERVICAL SPINE COMPLETE 4 OR 5 VIEWS; Future      Time Spent:  I spent 14 minutes on this date of service performing the following activities: obtaining history, performing examination, entering orders, documenting, preparing for visit, obtaining / reviewing records and providing counseling and education.

## 2023-06-01 ENCOUNTER — HOSPITAL ENCOUNTER (OUTPATIENT)
Dept: RADIOLOGY | Age: 53
Discharge: HOME | End: 2023-06-01
Attending: NURSE PRACTITIONER
Payer: COMMERCIAL

## 2023-06-01 DIAGNOSIS — G89.29 CHRONIC BILATERAL LOW BACK PAIN WITHOUT SCIATICA: ICD-10-CM

## 2023-06-01 DIAGNOSIS — M54.50 CHRONIC BILATERAL LOW BACK PAIN WITHOUT SCIATICA: ICD-10-CM

## 2023-06-01 DIAGNOSIS — M54.2 NECK PAIN: ICD-10-CM

## 2023-06-01 PROCEDURE — 72110 X-RAY EXAM L-2 SPINE 4/>VWS: CPT

## 2023-06-01 PROCEDURE — 72050 X-RAY EXAM NECK SPINE 4/5VWS: CPT

## 2023-08-30 ENCOUNTER — TELEPHONE (OUTPATIENT)
Dept: PRIMARY CARE | Facility: CLINIC | Age: 53
End: 2023-08-30
Payer: COMMERCIAL

## 2023-08-30 NOTE — TELEPHONE ENCOUNTER
Received a fax from [employer] asking for katie's signature.    Form is in Katie's clinical bin

## 2023-10-10 ENCOUNTER — OFFICE VISIT (OUTPATIENT)
Dept: PRIMARY CARE | Facility: CLINIC | Age: 53
End: 2023-10-10
Payer: COMMERCIAL

## 2023-10-10 VITALS
WEIGHT: 168.8 LBS | SYSTOLIC BLOOD PRESSURE: 136 MMHG | HEART RATE: 60 BPM | HEIGHT: 63 IN | TEMPERATURE: 98.3 F | OXYGEN SATURATION: 98 % | DIASTOLIC BLOOD PRESSURE: 78 MMHG | BODY MASS INDEX: 29.91 KG/M2 | RESPIRATION RATE: 16 BRPM

## 2023-10-10 DIAGNOSIS — E66.3 OVERWEIGHT WITH BODY MASS INDEX (BMI) OF 29 TO 29.9 IN ADULT: ICD-10-CM

## 2023-10-10 DIAGNOSIS — F41.9 ANXIETY: Primary | ICD-10-CM

## 2023-10-10 PROCEDURE — 3008F BODY MASS INDEX DOCD: CPT | Performed by: NURSE PRACTITIONER

## 2023-10-10 PROCEDURE — 99213 OFFICE O/P EST LOW 20 MIN: CPT | Performed by: NURSE PRACTITIONER

## 2023-10-10 RX ORDER — PHENTERMINE HYDROCHLORIDE 37.5 MG/1
37.5 CAPSULE ORAL EVERY MORNING
Qty: 30 CAPSULE | Refills: 0 | Status: SHIPPED | OUTPATIENT
Start: 2023-10-10 | End: 2023-12-10 | Stop reason: SDUPTHER

## 2023-10-10 ASSESSMENT — ENCOUNTER SYMPTOMS
DECREASED CONCENTRATION: 0
RESTLESSNESS: 0
PALPITATIONS: 0
NERVOUS/ANXIOUS: 1
DEPRESSED MOOD: 0
SHORTNESS OF BREATH: 0
INSOMNIA: 1
PANIC: 0

## 2023-10-10 NOTE — ASSESSMENT & PLAN NOTE
Feels she needs a kick start for weight loss.  Asking about ADD med to help with binging.  Phentermine daily in AM  Monitor weight  Regular exercise/strength training 3-4 x per week.  Whole foods/no processed/ low sugar diet  Follow up 3 months

## 2023-10-10 NOTE — PROGRESS NOTES
[facility] [place]  [address]  [place] [address]  [phone]  Fax [phone]      Patient ID: Maryjo Tovar                              : 1970    Visit Date: 10/10/2023    Chief Complaint: Follow-up (Med check, discuss shingrix)         Patient ID: Maryjo Tovar is a 53 y.o. female.    Patient Active Problem List   Diagnosis    Allergic rhinitis    Anxiety    Atopic dermatitis    Family history of coronary artery disease    Hemorrhoids, external    Primary hypothyroidism    Insomnia due to medical condition    Polycystic ovarian disease    Vitamin D deficiency    Hypercholesterolemia    Colon cancer screening    Chronic bilateral low back pain without sciatica    Neck pain    Overweight with body mass index (BMI) of 29 to 29.9 in adult         Current Outpatient Medications:     phentermine 37.5 mg capsule, Take 1 capsule (37.5 mg total) by mouth every morning., Disp: 30 capsule, Rfl: 0    ALPRAZolam (XANAX) 0.25 mg tablet, Take 1 tablet (0.25 mg total) by mouth 2 (two) times a day as needed for anxiety., Disp: 60 tablet, Rfl: 0    SYNTHROID 100 mcg tablet, TAKE 8 TABLETS BY MOUTH WEEKLY, Disp: , Rfl:     Allergies   Allergen Reactions    Sulfa (Sulfonamide Antibiotics) Hives       Social History     Tobacco Use    Smoking status: Never    Smokeless tobacco: Never   Vaping Use    Vaping Use: Never used   Substance Use Topics    Alcohol use: Yes    Drug use: Never       Health Maintenance   Topic Date Due    Zoster Vaccine (1 of 2) Never done    Influenza Vaccine (1) 2023    COVID-19 Vaccine (2  season) 2023    Depression Screening  2024    Breast Cancer Screening  2024    Colorectal Cancer Screening  2025    Cervical Cancer Screening  2026    Meningococcal ACWY  Aged Out    HIB Vaccines  Aged Out    IPV Vaccines  Aged Out    HPV Vaccines  Aged Out    Pneumococcal  " Aged Out    DTaP, Tdap, and Td Vaccines  Discontinued    Hepatitis B Vaccines  Discontinued    HIV Screening  Discontinued    Hepatitis C Screening  Discontinued       HPI  Routine med check.  Discuss weight loss help.    Anxiety  Presents for follow-up visit. Symptoms include excessive worry, insomnia and nervous/anxious behavior. Patient reports no chest pain, decreased concentration, depressed mood, palpitations, panic, restlessness, shortness of breath or suicidal ideas. Symptoms occur occasionally. The severity of symptoms is mild. The quality of sleep is good.     Compliance with medications: PRN Xanax. Treatment side effects: none.       The following have been reviewed and updated as appropriate in this visit:          Review of System  Review of Systems   Respiratory: Negative for shortness of breath.    Cardiovascular: Negative for chest pain and palpitations.   Psychiatric/Behavioral: Negative for decreased concentration and suicidal ideas. The patient is nervous/anxious and has insomnia.        Objective     Vitals  Vitals:    10/10/23 1615   BP: 136/78   BP Location: Left upper arm   Patient Position: Sitting   Pulse: 60   Resp: 16   Temp: 36.8 °C (98.3 °F)   TempSrc: Temporal   SpO2: 98%   Weight: 76.6 kg (168 lb 12.8 oz)   Height: 1.6 m (5' 3\")     Body mass index is 29.9 kg/m².      Physical Exam  Vitals reviewed.   Constitutional:       General: She is not in acute distress.     Appearance: Normal appearance. She is not ill-appearing, toxic-appearing or diaphoretic.   Cardiovascular:      Rate and Rhythm: Normal rate and regular rhythm.      Heart sounds: No murmur heard.     No friction rub. No gallop.   Pulmonary:      Effort: Pulmonary effort is normal.      Breath sounds: Normal breath sounds. No wheezing, rhonchi or rales.   Musculoskeletal:      Right lower leg: No edema.      Left lower leg: No edema.   Neurological:      Mental Status: She is alert and oriented to person, place, and " time.   Psychiatric:         Mood and Affect: Mood and affect normal.         Speech: Speech normal.         Behavior: Behavior normal.         Thought Content: Thought content does not include suicidal ideation. Thought content does not include suicidal plan.         Assessment/Plan     Problem List Items Addressed This Visit     Anxiety - Primary     PRN Alprazolam.  Uses sparingly.  Stable.         Overweight with body mass index (BMI) of 29 to 29.9 in adult     Feels she needs a kick start for weight loss.  Asking about ADD med to help with binging.  Phentermine daily in AM  Monitor weight  Regular exercise/strength training 3-4 x per week.  Whole foods/no processed/ low sugar diet  Follow up 3 months         Relevant Medications    phentermine 37.5 mg capsule           LUIS CARLOS Staley  10/10/2023

## 2024-01-10 ENCOUNTER — OFFICE VISIT (OUTPATIENT)
Dept: PRIMARY CARE | Facility: CLINIC | Age: 54
End: 2024-01-10
Payer: COMMERCIAL

## 2024-01-10 VITALS
BODY MASS INDEX: 27.82 KG/M2 | HEART RATE: 90 BPM | RESPIRATION RATE: 18 BRPM | OXYGEN SATURATION: 99 % | HEIGHT: 63 IN | WEIGHT: 157 LBS | SYSTOLIC BLOOD PRESSURE: 122 MMHG | TEMPERATURE: 97.9 F | DIASTOLIC BLOOD PRESSURE: 82 MMHG

## 2024-01-10 DIAGNOSIS — N60.11 FIBROCYSTIC BREAST CHANGES OF BOTH BREASTS: ICD-10-CM

## 2024-01-10 DIAGNOSIS — E55.9 VITAMIN D DEFICIENCY: ICD-10-CM

## 2024-01-10 DIAGNOSIS — N95.2 POSTMENOPAUSAL ATROPHIC VAGINITIS: ICD-10-CM

## 2024-01-10 DIAGNOSIS — E66.3 OVERWEIGHT WITH BODY MASS INDEX (BMI) OF 29 TO 29.9 IN ADULT: Primary | ICD-10-CM

## 2024-01-10 DIAGNOSIS — E03.9 PRIMARY HYPOTHYROIDISM: ICD-10-CM

## 2024-01-10 DIAGNOSIS — E78.00 HYPERCHOLESTEROLEMIA: ICD-10-CM

## 2024-01-10 DIAGNOSIS — F41.9 ANXIETY: ICD-10-CM

## 2024-01-10 DIAGNOSIS — N60.12 FIBROCYSTIC BREAST CHANGES OF BOTH BREASTS: ICD-10-CM

## 2024-01-10 PROBLEM — M54.2 NECK PAIN: Status: RESOLVED | Noted: 2023-05-25 | Resolved: 2024-01-10

## 2024-01-10 PROCEDURE — 3008F BODY MASS INDEX DOCD: CPT | Performed by: NURSE PRACTITIONER

## 2024-01-10 PROCEDURE — 99214 OFFICE O/P EST MOD 30 MIN: CPT | Performed by: NURSE PRACTITIONER

## 2024-01-10 RX ORDER — PHENTERMINE HYDROCHLORIDE 37.5 MG/1
37.5 CAPSULE ORAL EVERY MORNING
Qty: 30 CAPSULE | Refills: 0 | Status: SHIPPED | OUTPATIENT
Start: 2024-01-10 | End: 2024-03-01 | Stop reason: SDUPTHER

## 2024-01-10 RX ORDER — ESTRADIOL 10 UG/1
10 INSERT VAGINAL 2 TIMES WEEKLY
Qty: 24 TABLET | Refills: 1 | Status: SHIPPED | OUTPATIENT
Start: 2024-01-11 | End: 2024-07-09

## 2024-01-10 ASSESSMENT — ENCOUNTER SYMPTOMS
INSOMNIA: 1
HOARSE VOICE: 0
DRY SKIN: 0
HAIR LOSS: 0
DEPRESSED MOOD: 0
SHORTNESS OF BREATH: 0
FATIGUE: 0
NERVOUS/ANXIOUS: 1

## 2024-01-10 ASSESSMENT — PAIN SCALES - GENERAL: PAINLEVEL: 0-NO PAIN

## 2024-01-10 NOTE — ASSESSMENT & PLAN NOTE
BMI down to 27  Will continue Phentermine for now  Pt must start regular exercise program too!  Follow up 3 months

## 2024-01-10 NOTE — PROGRESS NOTES
[facility] at [address]  [place] [address]  [phone]  Fax [phone]      Patient ID: Maryjo Tovar                              : 1970    Visit Date: 1/10/2024    Chief Complaint: Follow-up (3m follow up /)         Patient ID: Maryjo Tovar is a 53 y.o. female.    Patient Active Problem List   Diagnosis   • Allergic rhinitis   • Anxiety   • Atopic dermatitis   • Family history of coronary artery disease   • Hemorrhoids, external   • Primary hypothyroidism   • Insomnia due to medical condition   • Polycystic ovarian disease   • Vitamin D deficiency   • Hypercholesterolemia   • Colon cancer screening   • Chronic bilateral low back pain without sciatica   • Overweight with body mass index (BMI) of 29 to 29.9 in adult   • Postmenopausal atrophic vaginitis         Current Outpatient Medications:   •  [START ON 2024] estradioL 10 mcg tablet, Insert 1 tablet (10 mcg total) into the vagina 2 (two) times a week (Mon, Thu)., Disp: 24 tablet, Rfl: 1  •  phentermine 37.5 mg capsule, Take 1 capsule (37.5 mg total) by mouth every morning., Disp: 30 capsule, Rfl: 0  •  ALPRAZolam (XANAX) 0.25 mg tablet, Take 1 tablet (0.25 mg total) by mouth 2 (two) times a day as needed for anxiety., Disp: 60 tablet, Rfl: 0  •  SYNTHROID 100 mcg tablet, TAKE 8 TABLETS BY MOUTH WEEKLY, Disp: , Rfl:     Allergies   Allergen Reactions   • Sulfa (Sulfonamide Antibiotics) Hives       Social History     Tobacco Use   • Smoking status: Never   • Smokeless tobacco: Never   Vaping Use   • Vaping Use: Never used   Substance Use Topics   • Alcohol use: Yes   • Drug use: Never       Health Maintenance   Topic Date Due   • Zoster Vaccine (1 of 2) Never done   • Influenza Vaccine (1) 2023   • COVID-19 Vaccine ( season) 2023   • Depression Screening  2024   • Breast Cancer Screening  2024   • Colorectal Cancer Screening  2025   •  Cervical Cancer Screening  12/02/2026   • Meningococcal ACWY  Aged Out   • RSV <20 months  Aged Out   • HIB Vaccines  Aged Out   • IPV Vaccines  Aged Out   • HPV Vaccines  Aged Out   • Pneumococcal  Aged Out   • DTaP, Tdap, and Td Vaccines  Discontinued   • Hepatitis B Vaccines  Discontinued   • HIV Screening  Discontinued   • Hepatitis C Screening  Discontinued       HPI  Weight and BP check on Phentermine  Has lost 11 lbs and feels very good  Has not started a regular exercise program but plans to.    Thyroid Problem  Presents for follow-up (hypothyroidism ) visit. Symptoms include anxiety. Patient reports no depressed mood, dry skin, fatigue, hair loss, hoarse voice or leg swelling. The symptoms have been stable. Her past medical history is significant for hyperlipidemia.   Anxiety  Presents for follow-up visit. Symptoms include excessive worry, insomnia and nervous/anxious behavior. Patient reports no chest pain, depressed mood or shortness of breath. Symptoms occur occasionally. The severity of symptoms is moderate.     Compliance with medications: PRN Alprazolam. Treatment side effects: none.   Hyperlipidemia  This is a chronic problem. The current episode started more than 1 year ago. Lipid results: borderline high. Exacerbating diseases include hypothyroidism. Pertinent negatives include no chest pain or shortness of breath. Current antihyperlipidemic treatment includes diet change and exercise. There are no compliance problems.        The following have been reviewed and updated as appropriate in this visit:   Allergies  Meds  Problems         Review of System  Review of Systems   Constitutional: Negative for fatigue.   HENT: Negative for hoarse voice.    Respiratory: Negative for shortness of breath.    Cardiovascular: Negative for chest pain.   Psychiatric/Behavioral: The patient is nervous/anxious and has insomnia.        Objective     Vitals  Vitals:    01/10/24 1528   BP: 122/82   BP Location: Left  "upper arm   Patient Position: Sitting   Pulse: 90   Resp: 18   Temp: 36.6 °C (97.9 °F)   TempSrc: Temporal   SpO2: 99%   Weight: 71.2 kg (157 lb)   Height: 1.6 m (5' 2.99\")     Body mass index is 27.82 kg/m².    Physical Exam  Physical Exam  Vitals reviewed.   Constitutional:       General: She is not in acute distress.     Appearance: Normal appearance. She is not ill-appearing, toxic-appearing or diaphoretic.   Cardiovascular:      Rate and Rhythm: Normal rate and regular rhythm.      Heart sounds: No murmur heard.     No friction rub. No gallop.   Pulmonary:      Effort: Pulmonary effort is normal.      Breath sounds: Normal breath sounds. No wheezing, rhonchi or rales.   Musculoskeletal:      Cervical back: Neck supple. No rigidity or tenderness.      Right lower leg: No edema.      Left lower leg: No edema.   Lymphadenopathy:      Cervical: No cervical adenopathy.   Neurological:      Mental Status: She is alert and oriented to person, place, and time.   Psychiatric:         Mood and Affect: Mood and affect normal.         Speech: Speech normal.         Behavior: Behavior normal.         Thought Content: Thought content does not include suicidal ideation. Thought content does not include suicidal plan.         Assessment/Plan     Problem List Items Addressed This Visit     Anxiety     PRN Alprazolam--use sparingly --no more than #10 per month         Primary hypothyroidism     Dr Solis manages  Check TSH/T4  Has been stable.         Relevant Orders    TSH w reflex FT4    Vitamin D deficiency     Check D level.         Relevant Orders    Vitamin D 25 hydroxy    Hypercholesterolemia     Check lipids/CMP         Relevant Orders    CBC and Differential    Comprehensive metabolic panel    Lipid panel    Overweight with body mass index (BMI) of 29 to 29.9 in adult - Primary     BMI down to 27  Will continue Phentermine for now  Pt must start regular exercise program too!  Follow up 3 months         Relevant " Medications    phentermine 37.5 mg capsule    Postmenopausal atrophic vaginitis     Trial Vagifem twice weekly.         Relevant Medications    estradioL 10 mcg tablet (Start on 1/11/2024)   Other Visit Diagnoses     Fibrocystic breast changes of both breasts        Relevant Orders    BI DIAGNOSTIC MAMMOGRAM BILATERAL (TOMOSYNTHESIS)              LUIS CARLOS Staley  1/10/2024

## 2024-02-02 LAB
25(OH)D3+25(OH)D2 SERPL-MCNC: 31 NG/ML (ref 30–100)
ALBUMIN SERPL-MCNC: 4.7 G/DL (ref 3.8–4.9)
ALBUMIN/GLOB SERPL: 2.6 {RATIO} (ref 1.2–2.2)
ALP SERPL-CCNC: 73 IU/L (ref 44–121)
ALT SERPL-CCNC: 10 IU/L (ref 0–32)
AST SERPL-CCNC: 13 IU/L (ref 0–40)
BASOPHILS # BLD AUTO: 0.1 X10E3/UL (ref 0–0.2)
BASOPHILS NFR BLD AUTO: 1 %
BILIRUB SERPL-MCNC: 0.6 MG/DL (ref 0–1.2)
BUN SERPL-MCNC: 16 MG/DL (ref 6–24)
BUN/CREAT SERPL: 21 (ref 9–23)
CALCIUM SERPL-MCNC: 9.5 MG/DL (ref 8.7–10.2)
CHLORIDE SERPL-SCNC: 101 MMOL/L (ref 96–106)
CHOLEST SERPL-MCNC: 224 MG/DL (ref 100–199)
CO2 SERPL-SCNC: 24 MMOL/L (ref 20–29)
CREAT SERPL-MCNC: 0.78 MG/DL (ref 0.57–1)
EGFRCR SERPLBLD CKD-EPI 2021: 91 ML/MIN/1.73
EOSINOPHIL # BLD AUTO: 0.2 X10E3/UL (ref 0–0.4)
EOSINOPHIL NFR BLD AUTO: 4 %
ERYTHROCYTE [DISTWIDTH] IN BLOOD BY AUTOMATED COUNT: 12.3 % (ref 11.7–15.4)
GLOBULIN SER CALC-MCNC: 1.8 G/DL (ref 1.5–4.5)
GLUCOSE SERPL-MCNC: 100 MG/DL (ref 70–99)
HCT VFR BLD AUTO: 39.3 % (ref 34–46.6)
HDLC SERPL-MCNC: 76 MG/DL
HGB BLD-MCNC: 13.3 G/DL (ref 11.1–15.9)
IMM GRANULOCYTES # BLD AUTO: 0 X10E3/UL (ref 0–0.1)
IMM GRANULOCYTES NFR BLD AUTO: 0 %
LDLC SERPL CALC-MCNC: 137 MG/DL (ref 0–99)
LYMPHOCYTES # BLD AUTO: 1.7 X10E3/UL (ref 0.7–3.1)
LYMPHOCYTES NFR BLD AUTO: 40 %
MCH RBC QN AUTO: 31.4 PG (ref 26.6–33)
MCHC RBC AUTO-ENTMCNC: 33.8 G/DL (ref 31.5–35.7)
MCV RBC AUTO: 93 FL (ref 79–97)
MONOCYTES # BLD AUTO: 0.3 X10E3/UL (ref 0.1–0.9)
MONOCYTES NFR BLD AUTO: 8 %
NEUTROPHILS # BLD AUTO: 2 X10E3/UL (ref 1.4–7)
NEUTROPHILS NFR BLD AUTO: 47 %
PLATELET # BLD AUTO: 201 X10E3/UL (ref 150–450)
POTASSIUM SERPL-SCNC: 4.2 MMOL/L (ref 3.5–5.2)
PROT SERPL-MCNC: 6.5 G/DL (ref 6–8.5)
RBC # BLD AUTO: 4.24 X10E6/UL (ref 3.77–5.28)
SODIUM SERPL-SCNC: 139 MMOL/L (ref 134–144)
T4 FREE SERPL-MCNC: 1.56 NG/DL (ref 0.82–1.77)
TRIGL SERPL-MCNC: 63 MG/DL (ref 0–149)
TSH SERPL DL<=0.005 MIU/L-ACNC: 1.03 UIU/ML (ref 0.45–4.5)
VLDLC SERPL CALC-MCNC: 11 MG/DL (ref 5–40)
WBC # BLD AUTO: 4.2 X10E3/UL (ref 3.4–10.8)

## 2024-02-26 ENCOUNTER — HOSPITAL ENCOUNTER (OUTPATIENT)
Dept: RADIOLOGY | Age: 54
Discharge: HOME | End: 2024-02-26
Attending: NURSE PRACTITIONER
Payer: COMMERCIAL

## 2024-02-26 DIAGNOSIS — Z78.0 POSTMENOPAUSAL STATUS: ICD-10-CM

## 2024-02-26 PROCEDURE — 77080 DXA BONE DENSITY AXIAL: CPT

## 2024-04-24 ENCOUNTER — TELEPHONE (OUTPATIENT)
Dept: PRIMARY CARE | Facility: CLINIC | Age: 54
End: 2024-04-24
Payer: COMMERCIAL

## 2024-04-24 NOTE — TELEPHONE ENCOUNTER
Patient was on the 2 step log for the 2 shingrix shot but she has not received the first one yet. It was logged 10/10/23

## 2024-05-02 ENCOUNTER — OFFICE VISIT (OUTPATIENT)
Dept: PRIMARY CARE | Facility: CLINIC | Age: 54
End: 2024-05-02
Payer: COMMERCIAL

## 2024-05-02 VITALS
TEMPERATURE: 97.7 F | HEART RATE: 93 BPM | OXYGEN SATURATION: 99 % | DIASTOLIC BLOOD PRESSURE: 82 MMHG | HEIGHT: 63 IN | RESPIRATION RATE: 18 BRPM | WEIGHT: 158 LBS | BODY MASS INDEX: 28 KG/M2 | SYSTOLIC BLOOD PRESSURE: 122 MMHG

## 2024-05-02 DIAGNOSIS — M79.672 PAIN IN BOTH FEET: ICD-10-CM

## 2024-05-02 DIAGNOSIS — E03.9 PRIMARY HYPOTHYROIDISM: ICD-10-CM

## 2024-05-02 DIAGNOSIS — Z12.11 COLON CANCER SCREENING: ICD-10-CM

## 2024-05-02 DIAGNOSIS — F41.9 ANXIETY: ICD-10-CM

## 2024-05-02 DIAGNOSIS — M79.671 PAIN IN BOTH FEET: ICD-10-CM

## 2024-05-02 DIAGNOSIS — E66.3 OVERWEIGHT WITH BODY MASS INDEX (BMI) OF 29 TO 29.9 IN ADULT: ICD-10-CM

## 2024-05-02 DIAGNOSIS — E55.9 VITAMIN D DEFICIENCY: ICD-10-CM

## 2024-05-02 DIAGNOSIS — M54.50 ACUTE BILATERAL LOW BACK PAIN WITHOUT SCIATICA: Primary | ICD-10-CM

## 2024-05-02 PROCEDURE — 99213 OFFICE O/P EST LOW 20 MIN: CPT | Performed by: NURSE PRACTITIONER

## 2024-05-02 PROCEDURE — 3008F BODY MASS INDEX DOCD: CPT | Performed by: NURSE PRACTITIONER

## 2024-05-02 RX ORDER — ALPRAZOLAM 0.25 MG/1
0.25 TABLET ORAL 2 TIMES DAILY PRN
Qty: 60 TABLET | Refills: 0 | Status: SHIPPED | OUTPATIENT
Start: 2024-05-02 | End: 2024-08-14 | Stop reason: SDUPTHER

## 2024-05-02 RX ORDER — PHENTERMINE HYDROCHLORIDE 37.5 MG/1
37.5 CAPSULE ORAL EVERY MORNING
Qty: 30 CAPSULE | Refills: 0 | Status: SHIPPED | OUTPATIENT
Start: 2024-05-02 | End: 2024-08-14

## 2024-05-02 ASSESSMENT — ENCOUNTER SYMPTOMS
NUMBNESS: 0
ABDOMINAL PAIN: 0
TINGLING: 0
BACK PAIN: 1
WEAKNESS: 0
BOWEL INCONTINENCE: 0
LEG PAIN: 0

## 2024-05-02 ASSESSMENT — PAIN SCALES - GENERAL: PAINLEVEL: 0-NO PAIN

## 2024-05-02 ASSESSMENT — PATIENT HEALTH QUESTIONNAIRE - PHQ9: SUM OF ALL RESPONSES TO PHQ9 QUESTIONS 1 & 2: 0

## 2024-05-02 NOTE — PROGRESS NOTES
[facility] at [address]  [place] [address]  [phone]  Fax [phone]      Patient ID: Maryjo Tovar                              : 1970    Visit Date: 2024    Chief Complaint: Follow-up (3m visit )         Patient ID: Maryjo Tovar is a 53 y.o. female.    Patient Active Problem List   Diagnosis    Allergic rhinitis    Anxiety    Atopic dermatitis    Family history of coronary artery disease    Hemorrhoids, external    Primary hypothyroidism    Insomnia due to medical condition    Polycystic ovarian disease    Vitamin D deficiency    Hypercholesterolemia    Colon cancer screening    Acute bilateral low back pain without sciatica    Overweight with body mass index (BMI) of 29 to 29.9 in adult    Postmenopausal atrophic vaginitis    Pain in both feet         Current Outpatient Medications:     ALPRAZolam (XANAX) 0.25 mg tablet, Take 1 tablet (0.25 mg total) by mouth 2 (two) times a day as needed for anxiety., Disp: 60 tablet, Rfl: 0    estradioL 10 mcg tablet, Insert 1 tablet (10 mcg total) into the vagina 2 (two) times a week (Mon, Thu)., Disp: 24 tablet, Rfl: 1    phentermine 37.5 mg capsule, Take 1 capsule (37.5 mg total) by mouth every morning., Disp: 30 capsule, Rfl: 0    SYNTHROID 100 mcg tablet, TAKE 8 TABLETS BY MOUTH WEEKLY, Disp: , Rfl:     Allergies   Allergen Reactions    Sulfa (Sulfonamide Antibiotics) Hives       Social History     Tobacco Use    Smoking status: Never    Smokeless tobacco: Never   Vaping Use    Vaping Use: Never used   Substance Use Topics    Alcohol use: Yes    Drug use: Never       Health Maintenance   Topic Date Due    Zoster Vaccine (1 of 2) Never done    Breast Cancer Screening  2024    COVID-19 Vaccine ( season) 2025 (Originally 2023)    Influenza Vaccine (Season Ended) 2024    Colorectal Cancer Screening  2025    Depression Screening  2025    Cervical  "Cancer Screening  12/02/2026    Meningococcal ACWY  Aged Out    RSV <20 months  Aged Out    HIB Vaccines  Aged Out    IPV Vaccines  Aged Out    HPV Vaccines  Aged Out    Pneumococcal  Aged Out    DTaP, Tdap, and Td Vaccines  Discontinued    Hepatitis B Vaccines  Discontinued    HIV Screening  Discontinued    Hepatitis C Screening  Discontinued       HPI  Back Pain  This is a new problem. The current episode started 1 to 4 weeks ago. The problem occurs daily. The problem is unchanged. The pain is present in the lumbar spine and sacro-iliac. The quality of the pain is described as aching. The pain does not radiate. The pain is moderate. The pain is The same all the time. The symptoms are aggravated by bending, position and sitting. Pertinent negatives include no abdominal pain, bladder incontinence, bowel incontinence, leg pain, numbness, tingling or weakness. She has tried heat, ice, NSAIDs and analgesics for the symptoms. The treatment provided no relief.       The following have been reviewed and updated as appropriate in this visit:   Allergies  Meds  Problems         Review of System  Review of Systems   Gastrointestinal:  Negative for abdominal pain and bowel incontinence.   Genitourinary:  Negative for bladder incontinence.   Musculoskeletal:  Positive for back pain.   Neurological:  Negative for tingling, weakness and numbness.       Objective     Vitals  Vitals:    05/02/24 1521   BP: 122/82   BP Location: Left upper arm   Patient Position: Sitting   Pulse: 93   Resp: 18   Temp: 36.5 °C (97.7 °F)   TempSrc: Temporal   SpO2: 99%   Weight: 71.7 kg (158 lb)   Height: 1.6 m (5' 2.99\")     Body mass index is 28 kg/m².    Physical Exam  Vitals reviewed.   Constitutional:       General: She is not in acute distress.     Appearance: Normal appearance. She is not ill-appearing, toxic-appearing or diaphoretic.   Cardiovascular:      Rate and Rhythm: Normal rate and regular rhythm.      Heart sounds: No murmur " heard.     No friction rub. No gallop.   Pulmonary:      Effort: Pulmonary effort is normal.      Breath sounds: Normal breath sounds. No wheezing, rhonchi or rales.   Musculoskeletal:      Lumbar back: No swelling, edema, deformity, spasms or tenderness. Normal range of motion.      Right lower leg: No edema.      Left lower leg: No edema.   Neurological:      Mental Status: She is alert and oriented to person, place, and time.         Assessment/Plan     Problem List Items Addressed This Visit       Anxiety     PRN Xanax  Use sparingly         Relevant Medications    ALPRAZolam (XANAX) 0.25 mg tablet    Primary hypothyroidism     Stable on Synthroid.         Vitamin D deficiency     Stable on D3.         Colon cancer screening     Direct colonoscopy ordered.         Relevant Orders    Direct Access Colonoscopy [facility]    Acute bilateral low back pain without sciatica - Primary     PT evaluate and treat 12 visits.         Relevant Orders    Ambulatory referral to Physical Therapy    Overweight with body mass index (BMI) of 29 to 29.9 in adult     Continue Phentermine daily           Relevant Medications    phentermine 37.5 mg capsule    Pain in both feet     Podiatry consult.         Relevant Orders    Ambulatory referral to Podiatry           LUIS CARLOS Staley  5/2/2024

## 2024-05-22 DIAGNOSIS — M54.2 CERVICALGIA: Primary | ICD-10-CM

## 2024-05-22 DIAGNOSIS — M54.50 LOW BACK PAIN, NON-SPECIFIC: ICD-10-CM

## 2024-05-23 ENCOUNTER — HOSPITAL ENCOUNTER (OUTPATIENT)
Dept: RADIOLOGY | Facility: CLINIC | Age: 54
Discharge: HOME | End: 2024-05-23
Attending: ORTHOPAEDIC SURGERY
Payer: COMMERCIAL

## 2024-05-23 ENCOUNTER — OFFICE VISIT (OUTPATIENT)
Dept: ORTHOPEDICS | Facility: CLINIC | Age: 54
End: 2024-05-23
Payer: COMMERCIAL

## 2024-05-23 DIAGNOSIS — M47.812 CERVICAL SPONDYLOSIS: ICD-10-CM

## 2024-05-23 DIAGNOSIS — M54.50 LOW BACK PAIN, NON-SPECIFIC: ICD-10-CM

## 2024-05-23 DIAGNOSIS — M47.816 LUMBAR SPONDYLOSIS: ICD-10-CM

## 2024-05-23 DIAGNOSIS — M54.2 CERVICALGIA: Primary | ICD-10-CM

## 2024-05-23 DIAGNOSIS — M50.30 DDD (DEGENERATIVE DISC DISEASE), CERVICAL: ICD-10-CM

## 2024-05-23 DIAGNOSIS — M54.2 CERVICALGIA: ICD-10-CM

## 2024-05-23 PROCEDURE — 72050 X-RAY EXAM NECK SPINE 4/5VWS: CPT

## 2024-05-23 PROCEDURE — 72110 X-RAY EXAM L-2 SPINE 4/>VWS: CPT

## 2024-05-23 PROCEDURE — 99204 OFFICE O/P NEW MOD 45 MIN: CPT | Performed by: ORTHOPAEDIC SURGERY

## 2024-05-23 RX ORDER — METHYLPREDNISOLONE 4 MG/1
TABLET ORAL
Qty: 21 TABLET | Refills: 0 | Status: SHIPPED | OUTPATIENT
Start: 2024-05-23 | End: 2024-12-18 | Stop reason: ALTCHOICE

## 2024-05-23 NOTE — PROGRESS NOTES
Subjective   Patient ID: Maryjo Tovar is a 54 y.o. female.    Chief Complaint : 1.  Neck pain  2.  Low back pain    History of Present Illness: Maryjo Tovar is a 54 y.o. female who presents to the office today with the above stated complaints.  She states she has had a chronic history of the above-stated complaints that she notices increases under stressful situations.  She is currently helping her children move into a new living situation, which she feels as though is rather stressful.  She denies radicular symptoms in bilateral upper and lower extremities.  Has not trialed physical therapy, has utilized anti-inflammatory medications with limited relief.  Denies any gait disturbance balance issues loss of fine motor control.  Denies any bowel or bladder changes any recent fevers chills night sweats recent trauma or illness.    Past Medical History :  Past Medical History:   Diagnosis Date   • Allergic rhinitis    • Anxiety    • Atopic dermatitis    • Family history of coronary artery disease    • Hemorrhoids, external    • Insomnia due to medical condition    • Polycystic ovarian disease    • Primary hypothyroidism          Past Surgical History :   Past Surgical History:   Procedure Laterality Date   •  SECTION      2x 2003,        Family History :  Family History   Problem Relation Age of Onset   • Hypertension Biological Mother    • Hypertension Biological Father    • Hyperlipidemia Biological Father        Social History :  Social History     Social History Narrative   • Not on file       REVIEW OF SYSTEMS : All others negative other than specified in the HPI.    Physical Examination : There were no vitals taken for this visit.    General: No acute distress  Neuro: Awake, Alert, Oriented x 3.    Eyes: Pupils equal and round  Mouth: Oropharynx clear  Respiratory: Breathing unlabored  Cardio: no edema or cyanosis  Skin: no rashes    Musculoskeletal: Patient has decreased range of motion of the  cervical and lumbar spine.  There is pain with range of motion.  There is no pain to palpation over the paraspinal musculature.  No pain to palpation over any bony prominence.  There is negative Spurling's bilaterally.  Overall full stable range of motion bilateral shoulders.  There is negative impingement negative Jobes exam.  Negative straight leg raise bilaterally.  Overall neurovascular intact in bilateral upper and lower extremities to light touch throughout has a 5 out of 5 motor strength.  Negative Donna's.  Negative clonus and downgoing Babinski.  Walks with a normal gait with no assisted device.    IMAGING : X-rays of the cervical spine taken today were personally reviewed which demonstrate Cervical degenerative disc disease and spondylosis most significant from C5-C7.  Facet arthropathy as well.  Anterolisthesis of C3 on C4 C4 on C5, retrolisthesis of C5 on C6    X-rays of the lumbar spine taken today were personally reviewed which demonstrates lumbar spondylosis mild from L3-L5    IMPRESSION : 1.  Neck pain  2.  Cervical degenerative disease and spondylosis  3.  Low back pain  4.  Lumbar spondylosis    PLAN : At this time we discussed the patient's history and physical exam in detail.  We discussed conservative management and provide her with a prescription for physical therapy to evaluate and treat her lumbar and cervical spines.  We also provide her with a Medrol Dosepak and instructions were given.  She was instructed to follow-up in the office in approximately 3 months for reevaluation, however may return sooner should her condition worsen    JEAN CLAUDE Sorensen  5/23/2024  10:38 AM

## 2024-07-26 ENCOUNTER — TELEPHONE (OUTPATIENT)
Dept: PRIMARY CARE | Facility: CLINIC | Age: 54
End: 2024-07-26
Payer: COMMERCIAL

## 2024-08-14 ENCOUNTER — OFFICE VISIT (OUTPATIENT)
Dept: PRIMARY CARE | Facility: CLINIC | Age: 54
End: 2024-08-14
Payer: COMMERCIAL

## 2024-08-14 VITALS
RESPIRATION RATE: 18 BRPM | DIASTOLIC BLOOD PRESSURE: 82 MMHG | SYSTOLIC BLOOD PRESSURE: 124 MMHG | OXYGEN SATURATION: 99 % | BODY MASS INDEX: 28.53 KG/M2 | TEMPERATURE: 97.7 F | WEIGHT: 161 LBS | HEIGHT: 63 IN | HEART RATE: 66 BPM

## 2024-08-14 DIAGNOSIS — E78.00 HYPERCHOLESTEROLEMIA: ICD-10-CM

## 2024-08-14 DIAGNOSIS — F41.9 ANXIETY: Primary | ICD-10-CM

## 2024-08-14 DIAGNOSIS — E66.3 OVERWEIGHT WITH BODY MASS INDEX (BMI) OF 29 TO 29.9 IN ADULT: ICD-10-CM

## 2024-08-14 PROCEDURE — 99213 OFFICE O/P EST LOW 20 MIN: CPT | Performed by: NURSE PRACTITIONER

## 2024-08-14 PROCEDURE — 3008F BODY MASS INDEX DOCD: CPT | Performed by: NURSE PRACTITIONER

## 2024-08-14 RX ORDER — PHENTERMINE HYDROCHLORIDE 15 MG/1
15 CAPSULE ORAL EVERY MORNING
Qty: 30 CAPSULE | Refills: 0 | Status: SHIPPED | OUTPATIENT
Start: 2024-08-14 | End: 2024-12-18 | Stop reason: SINTOL

## 2024-08-14 RX ORDER — ALPRAZOLAM 0.25 MG/1
0.25 TABLET ORAL 2 TIMES DAILY PRN
Qty: 60 TABLET | Refills: 0 | Status: SHIPPED | OUTPATIENT
Start: 2024-08-14 | End: 2024-11-21 | Stop reason: SDUPTHER

## 2024-08-14 ASSESSMENT — ENCOUNTER SYMPTOMS
SHORTNESS OF BREATH: 0
DEPRESSED MOOD: 0
PALPITATIONS: 0
NERVOUS/ANXIOUS: 1
PANIC: 0

## 2024-08-14 ASSESSMENT — PATIENT HEALTH QUESTIONNAIRE - PHQ9: SUM OF ALL RESPONSES TO PHQ9 QUESTIONS 1 & 2: 0

## 2024-08-14 ASSESSMENT — PAIN SCALES - GENERAL: PAINLEVEL: 0-NO PAIN

## 2024-08-14 NOTE — ASSESSMENT & PLAN NOTE
Latest Reference Range & Units 02/01/24 07:51   Cholesterol 100 - 199 mg/dL 224 (H)   Triglycerides 0 - 149 mg/dL 63   HDL >39 mg/dL 76   LDL Calculated 0 - 99 mg/dL 137 (H)   (H): Data is abnormally high    Borderline  Continue to work on diet, exercise and weight loss.

## 2024-08-14 NOTE — PROGRESS NOTES
[facility] at [address]  [place] [address]  [phone]  Fax [phone]      Patient ID: Maryjo Tovar                              : 1970    Visit Date: 2024    Chief Complaint: No chief complaint on file.         Patient ID: Maryjo Tovar is a 54 y.o. female.    Patient Active Problem List   Diagnosis    Allergic rhinitis    Anxiety    Atopic dermatitis    Family history of coronary artery disease    Hemorrhoids, external    Primary hypothyroidism    Insomnia due to medical condition    Polycystic ovarian disease    Vitamin D deficiency    Hypercholesterolemia    Colon cancer screening    Acute bilateral low back pain without sciatica    Overweight with body mass index (BMI) of 29 to 29.9 in adult    Postmenopausal atrophic vaginitis    Pain in both feet         Current Outpatient Medications:     ALPRAZolam (XANAX) 0.25 mg tablet, Take 1 tablet (0.25 mg total) by mouth 2 (two) times a day as needed for anxiety., Disp: 60 tablet, Rfl: 0    methylPREDNISolone (MEDROL DOSEPACK) 4 mg tablet, Follow package directions., Disp: 21 tablet, Rfl: 0    phentermine 15 mg capsule, Take 1 capsule (15 mg total) by mouth every morning., Disp: 30 capsule, Rfl: 0    SYNTHROID 100 mcg tablet, TAKE 8 TABLETS BY MOUTH WEEKLY, Disp: , Rfl:     estradioL 10 mcg tablet, Insert 1 tablet (10 mcg total) into the vagina 2 (two) times a week (Mon, Thu)., Disp: 24 tablet, Rfl: 1    Allergies   Allergen Reactions    Sulfa (Sulfonamide Antibiotics) Hives       Social History     Tobacco Use    Smoking status: Never    Smokeless tobacco: Never   Vaping Use    Vaping Use: Never used   Substance Use Topics    Alcohol use: Yes    Drug use: Never       Health Maintenance   Topic Date Due    Influenza Vaccine (1) 2024    Zoster Vaccine (1 of 2) 2025 (Originally 2020)    COVID-19 Vaccine (2023- season) 2025 (Originally 2023)     "Colorectal Cancer Screening  03/11/2025    Breast Cancer Screening  05/20/2025    Depression Screening  08/14/2025    Cervical Cancer Screening  12/02/2026    Meningococcal ACWY  Aged Out    RSV <20 months  Aged Out    HIB Vaccines  Aged Out    IPV Vaccines  Aged Out    HPV Vaccines  Aged Out    Pneumococcal  Aged Out    DTaP, Tdap, and Td Vaccines  Discontinued    Hepatitis B Vaccines  Discontinued    HIV Screening  Discontinued    Hepatitis C Screening  Discontinued       HPI  Routine med check  Would like to get back on Phentermine  Last dose did affect her sleep--maybe too strong  Working on weight loss.    Uses Xanax PRN anxiety    Anxiety  Presents for follow-up visit. Symptoms include excessive worry and nervous/anxious behavior. Patient reports no chest pain, depressed mood, palpitations, panic, shortness of breath or suicidal ideas. Symptoms occur occasionally. The severity of symptoms is moderate.     Compliance with medications: PRN Xanax. Treatment side effects: none.       The following have been reviewed and updated as appropriate in this visit:   Allergies  Meds  Problems         Review of System  Review of Systems   Respiratory:  Negative for shortness of breath.    Cardiovascular:  Negative for chest pain and palpitations.   Psychiatric/Behavioral:  Negative for suicidal ideas. The patient is nervous/anxious.        Objective     Vitals  Vitals:    08/14/24 1142   BP: 124/82   BP Location: Left upper arm   Patient Position: Sitting   Pulse: 66   Resp: 18   Temp: 36.5 °C (97.7 °F)   TempSrc: Temporal   SpO2: 99%   Weight: 73 kg (161 lb)   Height: 1.6 m (5' 2.99\")     Body mass index is 28.53 kg/m².    Physical Exam  Vitals reviewed.   Constitutional:       General: She is not in acute distress.     Appearance: Normal appearance. She is not ill-appearing, toxic-appearing or diaphoretic.   Cardiovascular:      Rate and Rhythm: Normal rate and regular rhythm.   Pulmonary:      Effort: Pulmonary " effort is normal. No respiratory distress.   Neurological:      Mental Status: She is alert and oriented to person, place, and time.   Psychiatric:         Mood and Affect: Mood and affect normal.         Speech: Speech normal.         Behavior: Behavior normal.         Assessment/Plan     Problem List Items Addressed This Visit       Anxiety - Primary     PRN Xanax.  Use sparingly         Relevant Medications    ALPRAZolam (XANAX) 0.25 mg tablet    Hypercholesterolemia      Latest Reference Range & Units 02/01/24 07:51   Cholesterol 100 - 199 mg/dL 224 (H)   Triglycerides 0 - 149 mg/dL 63   HDL >39 mg/dL 76   LDL Calculated 0 - 99 mg/dL 137 (H)   (H): Data is abnormally high    Borderline  Continue to work on diet, exercise and weight loss.         Overweight with body mass index (BMI) of 29 to 29.9 in adult     Restart lower dose Phentermine 15mg daily.  Follow up 4 months.         Relevant Medications    phentermine 15 mg capsule           LUIS CARLOS Staley  8/14/2024

## 2024-10-24 ENCOUNTER — OFFICE VISIT (OUTPATIENT)
Dept: PRIMARY CARE | Facility: CLINIC | Age: 54
End: 2024-10-24
Payer: COMMERCIAL

## 2024-10-24 VITALS
BODY MASS INDEX: 28.56 KG/M2 | SYSTOLIC BLOOD PRESSURE: 128 MMHG | DIASTOLIC BLOOD PRESSURE: 82 MMHG | WEIGHT: 161.2 LBS | RESPIRATION RATE: 18 BRPM | HEIGHT: 63 IN | HEART RATE: 67 BPM | OXYGEN SATURATION: 99 % | TEMPERATURE: 97.9 F

## 2024-10-24 DIAGNOSIS — M25.50 ARTHRALGIA OF MULTIPLE JOINTS: Primary | ICD-10-CM

## 2024-10-24 DIAGNOSIS — M19.072 PRIMARY OSTEOARTHRITIS OF BOTH FEET: ICD-10-CM

## 2024-10-24 DIAGNOSIS — M19.071 PRIMARY OSTEOARTHRITIS OF BOTH FEET: ICD-10-CM

## 2024-10-24 PROCEDURE — 3008F BODY MASS INDEX DOCD: CPT | Performed by: NURSE PRACTITIONER

## 2024-10-24 PROCEDURE — 99214 OFFICE O/P EST MOD 30 MIN: CPT | Performed by: NURSE PRACTITIONER

## 2024-10-24 RX ORDER — MELOXICAM 15 MG/1
15 TABLET ORAL DAILY
Qty: 30 TABLET | Refills: 0 | Status: SHIPPED | OUTPATIENT
Start: 2024-10-24 | End: 2024-11-08

## 2024-10-24 ASSESSMENT — ENCOUNTER SYMPTOMS
PAIN AT NIGHT: 1
JOINT SWELLING: 0
JOINT WARMTH: 0
FEVER: 0
NECK PAIN: 0
NECK STIFFNESS: 0
STIFFNESS: 1
ARTHRALGIAS: 1
CONSTITUTIONAL NEGATIVE: 1
PAIN WHILE RESTING: 1
WEAKNESS: 0
FATIGUE: 0
BACK PAIN: 1

## 2024-10-24 ASSESSMENT — PAIN SCALES - GENERAL: PAINLEVEL_OUTOF10: 0-NO PAIN

## 2024-10-24 ASSESSMENT — PATIENT HEALTH QUESTIONNAIRE - PHQ9: SUM OF ALL RESPONSES TO PHQ9 QUESTIONS 1 & 2: 0

## 2024-10-24 NOTE — ASSESSMENT & PLAN NOTE
Labs ordered to assess r/o autoimmune/infectious etiology  Needs a regular exercise program.  Meloxicam QD with food.

## 2024-10-24 NOTE — PROGRESS NOTES
[facility] at [address]  [place] [address]  [phone]  Fax [phone]      Patient ID: Maryjo Tovar                              : 1970    Visit Date: 10/24/2024    Chief Complaint: Arthritis         Patient ID: Maryjo Tovar is a 54 y.o. female.    Patient Active Problem List   Diagnosis    Allergic rhinitis    Anxiety    Atopic dermatitis    Family history of coronary artery disease    Hemorrhoids, external    Primary hypothyroidism    Insomnia due to medical condition    Polycystic ovarian disease    Vitamin D deficiency    Hypercholesterolemia    Colon cancer screening    Acute bilateral low back pain without sciatica    Overweight with body mass index (BMI) of 29 to 29.9 in adult    Postmenopausal atrophic vaginitis    Pain in both feet    Primary osteoarthritis of both feet    Arthralgia of multiple joints         Current Outpatient Medications:     ALPRAZolam (XANAX) 0.25 mg tablet, Take 1 tablet (0.25 mg total) by mouth 2 (two) times a day as needed for anxiety., Disp: 60 tablet, Rfl: 0    meloxicam (MOBIC) 15 mg tablet, Take 1 tablet (15 mg total) by mouth daily., Disp: 30 tablet, Rfl: 0    methylPREDNISolone (MEDROL DOSEPACK) 4 mg tablet, Follow package directions., Disp: 21 tablet, Rfl: 0    SYNTHROID 100 mcg tablet, TAKE 8 TABLETS BY MOUTH WEEKLY, Disp: , Rfl:     estradioL 10 mcg tablet, Insert 1 tablet (10 mcg total) into the vagina 2 (two) times a week (Mon, Thu)., Disp: 24 tablet, Rfl: 1    phentermine 15 mg capsule, Take 1 capsule (15 mg total) by mouth every morning., Disp: 30 capsule, Rfl: 0    Allergies   Allergen Reactions    Sulfa (Sulfonamide Antibiotics) Hives       Social History     Tobacco Use    Smoking status: Never    Smokeless tobacco: Never   Vaping Use    Vaping status: Never Used   Substance Use Topics    Alcohol use: Yes    Drug use: Never       Health Maintenance   Topic Date Due    Influenza  "Vaccine (1) 08/01/2024    COVID-19 Vaccine (2 - 2024-25 season) 09/01/2024    Zoster Vaccine (1 of 2) 05/02/2025 (Originally 5/13/2020)    Colorectal Cancer Screening  03/11/2025    Breast Cancer Screening  05/20/2025    Depression Screening  10/24/2025    Cervical Cancer Screening  12/02/2026    RSV Vaccine (1 - 1-dose 75+ series) 05/13/2045    Meningococcal ACWY  Aged Out    RSV <20 months  Aged Out    HIB Vaccines  Aged Out    IPV Vaccines  Aged Out    HPV Vaccines  Aged Out    Pneumococcal  Aged Out    DTaP, Tdap, and Td Vaccines  Discontinued    Hepatitis B Vaccines  Discontinued    HIV Screening  Discontinued    Hepatitis C Screening  Discontinued       HPI  Discuss joint pains and recent dx of arthritis by her podiatrist    Arthritis  Presents for follow-up visit. She complains of pain and stiffness. She reports no joint swelling or joint warmth. The symptoms have been worsening. Affected location: back, hips, LE, feet, hands. Associated symptoms include pain at night and pain while resting. Pertinent negatives include no fatigue or fever. Compliance with medications: taking Aleve--helps some.       The following have been reviewed and updated as appropriate in this visit:   Allergies  Meds  Problems         Review of System  Review of Systems   Constitutional: Negative.  Negative for fatigue and fever.   Musculoskeletal:  Positive for arthralgias, arthritis, back pain and stiffness. Negative for gait problem, joint swelling, neck pain and neck stiffness.   Neurological:  Negative for weakness.       Objective     Vitals  Vitals:    10/24/24 1503   BP: 128/82   BP Location: Left upper arm   Patient Position: Sitting   Pulse: 67   Resp: 18   Temp: 36.6 °C (97.9 °F)   TempSrc: Temporal   SpO2: 99%   Weight: 73.1 kg (161 lb 3.2 oz)   Height: 1.6 m (5' 2.99\")     Body mass index is 28.56 kg/m².    Physical Exam  Physical Exam  Vitals reviewed.   Constitutional:       General: She is not in acute distress.     " Appearance: Normal appearance. She is not ill-appearing, toxic-appearing or diaphoretic.   Cardiovascular:      Rate and Rhythm: Normal rate and regular rhythm.   Pulmonary:      Effort: Pulmonary effort is normal. No respiratory distress.   Musculoskeletal:         General: No swelling or deformity.      Right lower leg: No edema.      Left lower leg: No edema.   Neurological:      Mental Status: She is alert and oriented to person, place, and time.         Assessment/Plan     Problem List Items Addressed This Visit       Primary osteoarthritis of both feet     Meloxicam QD with food.  Regular exercise program  Continue cupportive footwear  No bare feet.         Relevant Medications    meloxicam (MOBIC) 15 mg tablet    Arthralgia of multiple joints - Primary     Labs ordered to assess r/o autoimmune/infectious etiology  Needs a regular exercise program.  Meloxicam QD with food.         Relevant Orders    STACEY Screen (Automated)    Rheumatoid factor    Sedimentation rate    Lyme Disease Antibodies (IgG, IgM),           LUIS CARLOS Staley  10/24/2024

## 2024-10-31 LAB
ANA SER QL IF: NEGATIVE
B BURGDOR IGG PATRN SER IB-IMP: NEGATIVE
B BURGDOR IGM PATRN SER IB-IMP: NEGATIVE
B BURGDOR18KD IGG SER QL IB: NORMAL
B BURGDOR23KD IGG SER QL IB: NORMAL
B BURGDOR23KD IGM SER QL IB: NORMAL
B BURGDOR28KD IGG SER QL IB: NORMAL
B BURGDOR30KD IGG SER QL IB: NORMAL
B BURGDOR39KD IGG SER QL IB: NORMAL
B BURGDOR39KD IGM SER QL IB: NORMAL
B BURGDOR41KD IGG SER QL IB: NORMAL
B BURGDOR41KD IGM SER QL IB: NORMAL
B BURGDOR45KD IGG SER QL IB: NORMAL
B BURGDOR58KD IGG SER QL IB: NORMAL
B BURGDOR66KD IGG SER QL IB: NORMAL
B BURGDOR93KD IGG SER QL IB: NORMAL
ERYTHROCYTE [SEDIMENTATION RATE] IN BLOOD BY WESTERGREN METHOD: 6 MM/HR (ref 0–40)
RHEUMATOID FACT SERPL-ACNC: <10 IU/ML

## 2024-11-08 DIAGNOSIS — M19.071 PRIMARY OSTEOARTHRITIS OF BOTH FEET: ICD-10-CM

## 2024-11-08 DIAGNOSIS — M19.072 PRIMARY OSTEOARTHRITIS OF BOTH FEET: ICD-10-CM

## 2024-11-08 RX ORDER — MELOXICAM 15 MG/1
15 TABLET ORAL DAILY
Qty: 30 TABLET | Refills: 0 | Status: SHIPPED | OUTPATIENT
Start: 2024-11-08 | End: 2024-12-09

## 2024-11-08 NOTE — TELEPHONE ENCOUNTER
"Medicine Refill Request    Last Office Visit: 10/24/2024   Last Consult Visit: Visit date not found  Last Telemedicine Visit: 5/25/2023 Katie Fontana CRNP    Next Appointment: 12/18/2024      Current Outpatient Medications:     ALPRAZolam (XANAX) 0.25 mg tablet, Take 1 tablet (0.25 mg total) by mouth 2 (two) times a day as needed for anxiety., Disp: 60 tablet, Rfl: 0    estradioL 10 mcg tablet, Insert 1 tablet (10 mcg total) into the vagina 2 (two) times a week (Mon, Thu)., Disp: 24 tablet, Rfl: 1    meloxicam (MOBIC) 15 mg tablet, Take 1 tablet (15 mg total) by mouth daily., Disp: 30 tablet, Rfl: 0    methylPREDNISolone (MEDROL DOSEPACK) 4 mg tablet, Follow package directions., Disp: 21 tablet, Rfl: 0    phentermine 15 mg capsule, Take 1 capsule (15 mg total) by mouth every morning., Disp: 30 capsule, Rfl: 0    SYNTHROID 100 mcg tablet, TAKE 8 TABLETS BY MOUTH WEEKLY, Disp: , Rfl:       BP Readings from Last 3 Encounters:   10/24/24 128/82   08/14/24 124/82   05/02/24 122/82       Recent Lab results:  Lab Results   Component Value Date    CHOL 224 (H) 02/01/2024   ,   Lab Results   Component Value Date    HDL 76 02/01/2024   ,   Lab Results   Component Value Date    LDLCALC 137 (H) 02/01/2024   ,   Lab Results   Component Value Date    TRIG 63 02/01/2024        Lab Results   Component Value Date    GLUCOSE 100 (H) 02/01/2024   , No results found for: \"HGBA1C\"      Lab Results   Component Value Date    CREATININE 0.78 02/01/2024       Lab Results   Component Value Date    TSH 1.030 02/01/2024         No results found for: \"HGBA1C\"  "

## 2024-12-09 DIAGNOSIS — M19.071 PRIMARY OSTEOARTHRITIS OF BOTH FEET: ICD-10-CM

## 2024-12-09 DIAGNOSIS — M19.072 PRIMARY OSTEOARTHRITIS OF BOTH FEET: ICD-10-CM

## 2024-12-09 RX ORDER — MELOXICAM 15 MG/1
15 TABLET ORAL DAILY
Qty: 30 TABLET | Refills: 0 | Status: SHIPPED | OUTPATIENT
Start: 2024-12-09

## 2024-12-09 NOTE — TELEPHONE ENCOUNTER
"Medicine Refill Request    Last Office Visit: 10/24/2024   Last Consult Visit: Visit date not found  Last Telemedicine Visit: 5/25/2023 Katie Fontana CRNP    Next Appointment: 12/18/2024      Current Outpatient Medications:     ALPRAZolam (XANAX) 0.25 mg tablet, Take 1 tablet (0.25 mg total) by mouth 2 (two) times a day as needed for anxiety., Disp: 60 tablet, Rfl: 0    estradioL 10 mcg tablet, Insert 1 tablet (10 mcg total) into the vagina 2 (two) times a week (Mon, Thu)., Disp: 24 tablet, Rfl: 1    meloxicam (MOBIC) 15 mg tablet, TAKE 1 TABLET BY MOUTH EVERY DAY, Disp: 30 tablet, Rfl: 0    methylPREDNISolone (MEDROL DOSEPACK) 4 mg tablet, Follow package directions., Disp: 21 tablet, Rfl: 0    phentermine 15 mg capsule, Take 1 capsule (15 mg total) by mouth every morning., Disp: 30 capsule, Rfl: 0    SYNTHROID 100 mcg tablet, TAKE 8 TABLETS BY MOUTH WEEKLY, Disp: , Rfl:       BP Readings from Last 3 Encounters:   10/24/24 128/82   08/14/24 124/82   05/02/24 122/82       Recent Lab results:  Lab Results   Component Value Date    CHOL 224 (H) 02/01/2024   ,   Lab Results   Component Value Date    HDL 76 02/01/2024   ,   Lab Results   Component Value Date    LDLCALC 137 (H) 02/01/2024   ,   Lab Results   Component Value Date    TRIG 63 02/01/2024        Lab Results   Component Value Date    GLUCOSE 100 (H) 02/01/2024   , No results found for: \"HGBA1C\"      Lab Results   Component Value Date    CREATININE 0.78 02/01/2024       Lab Results   Component Value Date    TSH 1.030 02/01/2024         No results found for: \"HGBA1C\"  "

## 2024-12-18 ENCOUNTER — OFFICE VISIT (OUTPATIENT)
Dept: PRIMARY CARE | Facility: CLINIC | Age: 54
End: 2024-12-18
Payer: COMMERCIAL

## 2024-12-18 VITALS
BODY MASS INDEX: 28.53 KG/M2 | TEMPERATURE: 97.8 F | SYSTOLIC BLOOD PRESSURE: 118 MMHG | OXYGEN SATURATION: 99 % | DIASTOLIC BLOOD PRESSURE: 78 MMHG | WEIGHT: 161 LBS | RESPIRATION RATE: 18 BRPM | HEART RATE: 76 BPM | HEIGHT: 63 IN

## 2024-12-18 DIAGNOSIS — L70.0 ACNE VULGARIS: ICD-10-CM

## 2024-12-18 DIAGNOSIS — E55.9 VITAMIN D DEFICIENCY: ICD-10-CM

## 2024-12-18 DIAGNOSIS — E78.00 HYPERCHOLESTEROLEMIA: ICD-10-CM

## 2024-12-18 DIAGNOSIS — L65.9 HAIR LOSS: ICD-10-CM

## 2024-12-18 DIAGNOSIS — E03.9 PRIMARY HYPOTHYROIDISM: Primary | ICD-10-CM

## 2024-12-18 DIAGNOSIS — E66.3 OVERWEIGHT WITH BODY MASS INDEX (BMI) OF 29 TO 29.9 IN ADULT: ICD-10-CM

## 2024-12-18 PROBLEM — M25.50 ARTHRALGIA OF MULTIPLE JOINTS: Status: RESOLVED | Noted: 2024-10-24 | Resolved: 2024-12-18

## 2024-12-18 PROBLEM — M54.50 ACUTE BILATERAL LOW BACK PAIN WITHOUT SCIATICA: Status: RESOLVED | Noted: 2021-10-06 | Resolved: 2024-12-18

## 2024-12-18 PROCEDURE — 3008F BODY MASS INDEX DOCD: CPT | Performed by: NURSE PRACTITIONER

## 2024-12-18 PROCEDURE — 99214 OFFICE O/P EST MOD 30 MIN: CPT | Performed by: NURSE PRACTITIONER

## 2024-12-18 RX ORDER — TRETINOIN 0.25 MG/G
CREAM TOPICAL NIGHTLY
Qty: 45 G | Refills: 0 | Status: SHIPPED | OUTPATIENT
Start: 2024-12-18 | End: 2025-12-18

## 2024-12-18 ASSESSMENT — ENCOUNTER SYMPTOMS
DEPRESSED MOOD: 0
NERVOUS/ANXIOUS: 1
INSOMNIA: 1
SHORTNESS OF BREATH: 0

## 2024-12-18 ASSESSMENT — PATIENT HEALTH QUESTIONNAIRE - PHQ9: SUM OF ALL RESPONSES TO PHQ9 QUESTIONS 1 & 2: 0

## 2024-12-18 ASSESSMENT — PAIN SCALES - GENERAL: PAINLEVEL_OUTOF10: 0-NO PAIN

## 2024-12-18 NOTE — PROGRESS NOTES
[facility] at [address]  [place] [address]  [phone]  Fax [phone]      Patient ID: Maryjo Tovar                              : 1970    Visit Date: 2024    Chief Complaint: Follow-up         Patient ID: Maryjo Tovar is a 54 y.o. female.    Patient Active Problem List   Diagnosis    Allergic rhinitis    Anxiety    Atopic dermatitis    Family history of coronary artery disease    Hemorrhoids, external    Primary hypothyroidism    Insomnia due to medical condition    Polycystic ovarian disease    Vitamin D deficiency    Hypercholesterolemia    Colon cancer screening    Overweight with body mass index (BMI) of 29 to 29.9 in adult    Postmenopausal atrophic vaginitis    Pain in both feet    Primary osteoarthritis of both feet    Hair loss    Acne vulgaris         Current Outpatient Medications:     ALPRAZolam (XANAX) 0.25 mg tablet, Take 1 tablet (0.25 mg total) by mouth 2 (two) times a day as needed for anxiety., Disp: 60 tablet, Rfl: 0    meloxicam (MOBIC) 15 mg tablet, TAKE 1 TABLET BY MOUTH EVERY DAY, Disp: 30 tablet, Rfl: 0    SYNTHROID 100 mcg tablet, TAKE 8 TABLETS BY MOUTH WEEKLY, Disp: , Rfl:     tretinoin (RETIN-A) 0.025 % cream, Apply topically nightly., Disp: 45 g, Rfl: 0    estradioL 10 mcg tablet, Insert 1 tablet (10 mcg total) into the vagina 2 (two) times a week (Mon, Thu)., Disp: 24 tablet, Rfl: 1    Allergies   Allergen Reactions    Sulfa (Sulfonamide Antibiotics) Hives       Social History     Tobacco Use    Smoking status: Never    Smokeless tobacco: Never   Vaping Use    Vaping status: Never Used   Substance Use Topics    Alcohol use: Yes    Drug use: Never       Health Maintenance   Topic Date Due    Influenza Vaccine (1) 2024    COVID-19 Vaccine ( season) 2024    Zoster Vaccine (1 of 2) 2025 (Originally 2020)    Colorectal Cancer Screening  2025    Breast Cancer  Screening  05/20/2025    Depression Screening  12/18/2025    Cervical Cancer Screening  12/02/2026    RSV Vaccine (1 - 1-dose 75+ series) 05/13/2045    Meningococcal ACWY  Aged Out    RSV <20 months  Aged Out    HIB Vaccines  Aged Out    IPV Vaccines  Aged Out    HPV Vaccines  Aged Out    Pneumococcal  Aged Out    DTaP, Tdap, and Td Vaccines  Discontinued    Hepatitis B Vaccines  Discontinued    HIV Screening  Discontinued    Hepatitis C Screening  Discontinued       HPI  Med check/follow up  Figured out that the Phentermine caused muscle and joint pains  She has stopped it.  Meloxicam does help arthritic pain.  Is having some chronic hair loss and would like to get labs before the end of the year.  Also asking for Retin A for skin.    Hyperlipidemia  This is a chronic problem. The current episode started more than 1 year ago. Lipid results: borderline high. There are no known factors aggravating her hyperlipidemia. Pertinent negatives include no chest pain or shortness of breath. Current antihyperlipidemic treatment includes diet change and exercise. There are no compliance problems.    Anxiety  Presents for follow-up visit. Symptoms include excessive worry, insomnia and nervous/anxious behavior. Patient reports no chest pain, depressed mood, shortness of breath or suicidal ideas. Symptoms occur occasionally. The severity of symptoms is moderate.     Compliance with medications: PRN Xanax--uses sparingly. Treatment side effects: none.       The following have been reviewed and updated as appropriate in this visit:          Review of System  Review of Systems   Respiratory:  Negative for shortness of breath.    Cardiovascular:  Negative for chest pain.   Psychiatric/Behavioral:  Negative for suicidal ideas. The patient is nervous/anxious and has insomnia.        Objective     Vitals  Vitals:    12/18/24 1510   BP: 118/78   BP Location: Left upper arm   Patient Position: Sitting   Pulse: 76   Resp: 18   Temp: 36.6 °C  "(97.8 °F)   TempSrc: Temporal   SpO2: 99%   Weight: 73 kg (161 lb)   Height: 1.6 m (5' 2.99\")     Body mass index is 28.53 kg/m².      Physical Exam  Vitals reviewed.   Constitutional:       General: She is not in acute distress.     Appearance: Normal appearance. She is not ill-appearing, toxic-appearing or diaphoretic.   Cardiovascular:      Rate and Rhythm: Normal rate and regular rhythm.      Heart sounds: No murmur heard.     No friction rub. No gallop.   Pulmonary:      Effort: Pulmonary effort is normal.      Breath sounds: Normal breath sounds. No wheezing, rhonchi or rales.   Musculoskeletal:      Right lower leg: No edema.      Left lower leg: No edema.   Neurological:      Mental Status: She is alert and oriented to person, place, and time.   Psychiatric:         Mood and Affect: Mood and affect normal.         Speech: Speech normal.         Behavior: Behavior normal.         Thought Content: Thought content does not include suicidal ideation. Thought content does not include suicidal plan.         Assessment/Plan     Problem List Items Addressed This Visit       Primary hypothyroidism - Primary     Check TSH         Relevant Orders    TSH 3rd Generation    Vitamin D deficiency     Check D level         Relevant Orders    Vitamin D 25 hydroxy    Hypercholesterolemia     Labs ordered.  Lowfat diet, low cholesterol diet  Regular exercise.         Relevant Orders    CBC and Differential    Comprehensive metabolic panel    Lipid panel    Overweight with body mass index (BMI) of 29 to 29.9 in adult     Off Phentermine  BMI down to 28  Work on diet and exercise for now.         Hair loss     Labs ordered to assess.         Relevant Orders    Iron and TIBC    Ferritin    Acne vulgaris     Retin A nightly.         Relevant Medications    tretinoin (RETIN-A) 0.025 % cream           LUIS CARLOS Staley  12/18/2024  "

## 2024-12-31 LAB
25(OH)D3+25(OH)D2 SERPL-MCNC: 32.3 NG/ML (ref 30–100)
ALBUMIN SERPL-MCNC: 4.3 G/DL (ref 3.8–4.9)
ALP SERPL-CCNC: 89 IU/L (ref 44–121)
ALT SERPL-CCNC: 13 IU/L (ref 0–32)
AST SERPL-CCNC: 13 IU/L (ref 0–40)
BASOPHILS # BLD AUTO: 0 X10E3/UL (ref 0–0.2)
BASOPHILS NFR BLD AUTO: 1 %
BILIRUB SERPL-MCNC: 0.6 MG/DL (ref 0–1.2)
BUN SERPL-MCNC: 12 MG/DL (ref 6–24)
BUN/CREAT SERPL: 16 (ref 9–23)
CALCIUM SERPL-MCNC: 9.8 MG/DL (ref 8.7–10.2)
CHLORIDE SERPL-SCNC: 103 MMOL/L (ref 96–106)
CHOLEST SERPL-MCNC: 254 MG/DL (ref 100–199)
CO2 SERPL-SCNC: 24 MMOL/L (ref 20–29)
CREAT SERPL-MCNC: 0.75 MG/DL (ref 0.57–1)
EGFRCR SERPLBLD CKD-EPI 2021: 95 ML/MIN/1.73
EOSINOPHIL # BLD AUTO: 0.1 X10E3/UL (ref 0–0.4)
EOSINOPHIL NFR BLD AUTO: 2 %
ERYTHROCYTE [DISTWIDTH] IN BLOOD BY AUTOMATED COUNT: 11.8 % (ref 11.7–15.4)
FERRITIN SERPL-MCNC: 115 NG/ML (ref 15–150)
GLOBULIN SER CALC-MCNC: 2.3 G/DL (ref 1.5–4.5)
GLUCOSE SERPL-MCNC: 101 MG/DL (ref 70–99)
HCT VFR BLD AUTO: 39 % (ref 34–46.6)
HDLC SERPL-MCNC: 84 MG/DL
HGB BLD-MCNC: 12.7 G/DL (ref 11.1–15.9)
IMM GRANULOCYTES # BLD AUTO: 0 X10E3/UL (ref 0–0.1)
IMM GRANULOCYTES NFR BLD AUTO: 1 %
IRON SATN MFR SERPL: 29 % (ref 15–55)
IRON SERPL-MCNC: 90 UG/DL (ref 27–159)
LDLC SERPL CALC-MCNC: 156 MG/DL (ref 0–99)
LYMPHOCYTES # BLD AUTO: 1.5 X10E3/UL (ref 0.7–3.1)
LYMPHOCYTES NFR BLD AUTO: 25 %
MCH RBC QN AUTO: 30.6 PG (ref 26.6–33)
MCHC RBC AUTO-ENTMCNC: 32.6 G/DL (ref 31.5–35.7)
MCV RBC AUTO: 94 FL (ref 79–97)
MONOCYTES # BLD AUTO: 0.4 X10E3/UL (ref 0.1–0.9)
MONOCYTES NFR BLD AUTO: 7 %
NEUTROPHILS # BLD AUTO: 3.8 X10E3/UL (ref 1.4–7)
NEUTROPHILS NFR BLD AUTO: 64 %
PLATELET # BLD AUTO: 242 X10E3/UL (ref 150–450)
POTASSIUM SERPL-SCNC: 4.3 MMOL/L (ref 3.5–5.2)
PROT SERPL-MCNC: 6.6 G/DL (ref 6–8.5)
RBC # BLD AUTO: 4.15 X10E6/UL (ref 3.77–5.28)
SODIUM SERPL-SCNC: 142 MMOL/L (ref 134–144)
TIBC SERPL-MCNC: 307 UG/DL (ref 250–450)
TRIGL SERPL-MCNC: 82 MG/DL (ref 0–149)
TSH SERPL DL<=0.005 MIU/L-ACNC: 1.72 UIU/ML (ref 0.45–4.5)
UIBC SERPL-MCNC: 217 UG/DL (ref 131–425)
VLDLC SERPL CALC-MCNC: 14 MG/DL (ref 5–40)
WBC # BLD AUTO: 6 X10E3/UL (ref 3.4–10.8)

## 2025-01-31 ENCOUNTER — TELEMEDICINE (OUTPATIENT)
Dept: PRIMARY CARE | Facility: CLINIC | Age: 55
End: 2025-01-31
Payer: COMMERCIAL

## 2025-01-31 DIAGNOSIS — J01.00 ACUTE NON-RECURRENT MAXILLARY SINUSITIS: Primary | ICD-10-CM

## 2025-01-31 PROCEDURE — 99212 OFFICE O/P EST SF 10 MIN: CPT | Mod: 95 | Performed by: NURSE PRACTITIONER

## 2025-01-31 RX ORDER — AZITHROMYCIN 250 MG/1
TABLET, FILM COATED ORAL
Qty: 6 TABLET | Refills: 0 | Status: SHIPPED | OUTPATIENT
Start: 2025-01-31 | End: 2025-02-05

## 2025-01-31 RX ORDER — FLUTICASONE PROPIONATE 50 MCG
1 SPRAY, SUSPENSION (ML) NASAL DAILY
Qty: 16 G | Refills: 5 | Status: SHIPPED | OUTPATIENT
Start: 2025-01-31

## 2025-01-31 ASSESSMENT — ENCOUNTER SYMPTOMS
SHORTNESS OF BREATH: 0
SWOLLEN GLANDS: 0
HEADACHES: 1
SINUS COMPLAINT: 1
SORE THROAT: 0
CHILLS: 0
SINUS PRESSURE: 1
COUGH: 1

## 2025-01-31 NOTE — ASSESSMENT & PLAN NOTE
Zpack as directed  Decongestants PRN  Push po fluids  Steam in shower  Follow up if symptoms worsen/persist.    Orders:    azithromycin (ZITHROMAX) 250 mg tablet; Take 2 tablets the first day, then 1 tablet daily for 4 days.    fluticasone propionate (FLONASE) 50 mcg/actuation nasal spray; Administer 1 spray into each nostril daily.

## 2025-01-31 NOTE — PROGRESS NOTES
Verification of Patient Location:  The patient affirms they are currently located in the following state: [place]    Are you in your home or a private residence? Yes    Request for Consent:    Audio and Video Encounter   Hello, my name is LUIS CARLOS Staley.  Before we proceed, can you please verify your identification by telling me your full name and date of birth?  Can you tell me who is in the room with you?    You and I are about to have a telemedicine check-in or visit because you have requested it.  This is a live video-conference.  I am a real person, speaking to you in real time.  There is no one else with me on the video-conference. I am not recording this conversation and I am asking you not to record it.  This telemedicine visit will be billed to your health insurance or you, if you are self-insured.  You understand you will be responsible for any copayments or coinsurances that apply to your telemedicine visit.  Communication platform used for this encounter:  Meludia Video Visit (Epic Video Client)       Before starting our telemedicine visit, I am required to get your consent for this virtual check-in or visit by telemedicine. Do you consent?    Patient Response to Request for Consent:  Yes    Patient Active Problem List   Diagnosis    Allergic rhinitis    Anxiety    Atopic dermatitis    Family history of coronary artery disease    Hemorrhoids, external    Primary hypothyroidism    Insomnia due to medical condition    Polycystic ovarian disease    Acute non-recurrent maxillary sinusitis    Vitamin D deficiency    Hypercholesterolemia    Colon cancer screening    Overweight with body mass index (BMI) of 29 to 29.9 in adult    Postmenopausal atrophic vaginitis    Pain in both feet    Primary osteoarthritis of both feet    Hair loss    Acne vulgaris     Medications Ordered Prior to Encounter[1]  Allergies   Allergen Reactions    Sulfa (Sulfonamide Antibiotics) Hives     Social History     Tobacco  Use    Smoking status: Never    Smokeless tobacco: Never   Vaping Use    Vaping status: Never Used   Substance Use Topics    Alcohol use: Yes    Drug use: Never     Health Maintenance   Topic Date Due    Influenza Vaccine (1) 08/01/2024    COVID-19 Vaccine (2 - 2024-25 season) 09/01/2024    Zoster Vaccine (1 of 2) 05/02/2025 (Originally 5/13/2020)    Colorectal Cancer Screening  03/11/2025    Breast Cancer Screening  05/20/2025    Depression Screening  12/18/2025    Cervical Cancer Screening  12/02/2026    RSV Vaccine (1 - 1-dose 75+ series) 05/13/2045    Meningococcal ACWY  Aged Out    RSV <20 months  Aged Out    HIB Vaccines  Aged Out    IPV Vaccines  Aged Out    Meningococcal B  Aged Out    HPV Vaccines  Aged Out    Pneumococcal  Aged Out    DTaP, Tdap, and Td Vaccines  Discontinued    Hepatitis B Vaccines  Discontinued    HIV Screening  Discontinued    Hepatitis C Screening  Discontinued       Visit Documentation:  Subjective     Patient ID: Maryjo Tovar is a 54 y.o. female.  1970      Worsening sinus pain and pressure.  Ongoing for over 1 week  Tested negative for COVID and flu  Increased post nasal drip causing cough    Sinus Problem  This is a new problem. The current episode started 1 to 4 weeks ago. The problem has been gradually worsening since onset. There has been no fever. The pain is mild. Associated symptoms include congestion, coughing, headaches and sinus pressure. Pertinent negatives include no chills, shortness of breath, sore throat or swollen glands. (Post nasal drip  fatigue) Past treatments include oral decongestants, saline nose sprays and acetaminophen. The treatment provided no relief.       The following have been reviewed and updated as appropriate in this visit:        Review of Systems   Constitutional:  Negative for chills.   HENT:  Positive for congestion and sinus pressure. Negative for sore throat.    Respiratory:  Positive for cough. Negative for shortness of breath.     Neurological:  Positive for headaches.     Physical Exam  Constitutional:       General: She is not in acute distress.     Appearance: Normal appearance. She is not ill-appearing, toxic-appearing or diaphoretic.   HENT:      Nose: Congestion present. No rhinorrhea.   Pulmonary:      Effort: Pulmonary effort is normal. No respiratory distress.   Neurological:      Mental Status: She is alert and oriented to person, place, and time.   Psychiatric:         Mood and Affect: Mood and affect normal.         Speech: Speech normal.         Behavior: Behavior normal.         Assessment & Plan  Acute non-recurrent maxillary sinusitis  Zpack as directed  Decongestants PRN  Push po fluids  Steam in shower  Follow up if symptoms worsen/persist.    Orders:    azithromycin (ZITHROMAX) 250 mg tablet; Take 2 tablets the first day, then 1 tablet daily for 4 days.    fluticasone propionate (FLONASE) 50 mcg/actuation nasal spray; Administer 1 spray into each nostril daily.      Time Spent:  I spent 14 minutes on this date of service performing the following activities: obtaining history, performing examination, entering orders, documenting, preparing for visit, obtaining / reviewing records, and providing counseling and education.         [1]   Current Outpatient Medications on File Prior to Visit   Medication Sig Dispense Refill    ALPRAZolam (XANAX) 0.25 mg tablet Take 1 tablet (0.25 mg total) by mouth 2 (two) times a day as needed for anxiety. 60 tablet 0    estradioL 10 mcg tablet Insert 1 tablet (10 mcg total) into the vagina 2 (two) times a week (Mon, Thu). 24 tablet 1    meloxicam (MOBIC) 15 mg tablet TAKE 1 TABLET BY MOUTH EVERY DAY 30 tablet 0    SYNTHROID 100 mcg tablet TAKE 8 TABLETS BY MOUTH WEEKLY      tretinoin (RETIN-A) 0.025 % cream Apply topically nightly. 45 g 0     No current facility-administered medications on file prior to visit.

## 2025-05-22 ENCOUNTER — TELEPHONE (OUTPATIENT)
Dept: PRIMARY CARE | Facility: CLINIC | Age: 55
End: 2025-05-22
Payer: COMMERCIAL

## 2025-05-22 DIAGNOSIS — F41.9 ANXIETY: ICD-10-CM

## 2025-05-22 RX ORDER — ALPRAZOLAM 0.25 MG/1
0.25 TABLET ORAL 2 TIMES DAILY PRN
Qty: 60 TABLET | Refills: 0 | Status: SHIPPED | OUTPATIENT
Start: 2025-05-22

## 2025-07-17 DIAGNOSIS — L70.0 ACNE VULGARIS: ICD-10-CM

## 2025-07-17 DIAGNOSIS — F41.9 ANXIETY: ICD-10-CM

## 2025-07-18 RX ORDER — ALPRAZOLAM 0.25 MG/1
0.25 TABLET ORAL 2 TIMES DAILY PRN
Qty: 60 TABLET | Refills: 0 | Status: SHIPPED | OUTPATIENT
Start: 2025-07-18

## 2025-07-18 RX ORDER — TRETINOIN 0.25 MG/G
CREAM TOPICAL NIGHTLY
Qty: 45 G | Refills: 0 | Status: SHIPPED | OUTPATIENT
Start: 2025-07-18 | End: 2026-07-18

## 2025-08-08 ENCOUNTER — OFFICE VISIT (OUTPATIENT)
Dept: PRIMARY CARE | Facility: CLINIC | Age: 55
End: 2025-08-08
Payer: COMMERCIAL

## 2025-08-08 VITALS
OXYGEN SATURATION: 97 % | SYSTOLIC BLOOD PRESSURE: 120 MMHG | WEIGHT: 160 LBS | HEART RATE: 76 BPM | DIASTOLIC BLOOD PRESSURE: 82 MMHG | TEMPERATURE: 97.7 F | BODY MASS INDEX: 28.35 KG/M2 | HEIGHT: 63 IN

## 2025-08-08 DIAGNOSIS — Z12.11 COLON CANCER SCREENING: ICD-10-CM

## 2025-08-08 DIAGNOSIS — Z12.31 BREAST CANCER SCREENING BY MAMMOGRAM: ICD-10-CM

## 2025-08-08 DIAGNOSIS — N95.1 SYMPTOMATIC MENOPAUSAL OR FEMALE CLIMACTERIC STATES: ICD-10-CM

## 2025-08-08 DIAGNOSIS — E78.00 HYPERCHOLESTEROLEMIA: ICD-10-CM

## 2025-08-08 DIAGNOSIS — E03.9 PRIMARY HYPOTHYROIDISM: ICD-10-CM

## 2025-08-08 DIAGNOSIS — L70.0 ACNE VULGARIS: ICD-10-CM

## 2025-08-08 DIAGNOSIS — E55.9 VITAMIN D DEFICIENCY: ICD-10-CM

## 2025-08-08 DIAGNOSIS — F41.9 ANXIETY: Primary | ICD-10-CM

## 2025-08-08 PROBLEM — J01.00 ACUTE NON-RECURRENT MAXILLARY SINUSITIS: Status: RESOLVED | Noted: 2020-01-15 | Resolved: 2025-08-08

## 2025-08-08 PROCEDURE — 99214 OFFICE O/P EST MOD 30 MIN: CPT | Performed by: NURSE PRACTITIONER

## 2025-08-08 PROCEDURE — 3008F BODY MASS INDEX DOCD: CPT | Performed by: NURSE PRACTITIONER

## 2025-08-08 RX ORDER — LEVOTHYROXINE SODIUM 125 UG/1
125 TABLET ORAL
Qty: 90 TABLET | Refills: 3 | Status: SHIPPED | OUTPATIENT
Start: 2025-08-08 | End: 2026-08-08

## 2025-08-08 RX ORDER — PROGESTERONE 100 MG/1
CAPSULE ORAL
COMMUNITY
Start: 2025-08-04

## 2025-08-08 RX ORDER — TRETINOIN 0.5 MG/G
CREAM TOPICAL NIGHTLY
Qty: 45 G | Refills: 1 | Status: SHIPPED | OUTPATIENT
Start: 2025-08-08 | End: 2026-08-08

## 2025-08-08 ASSESSMENT — ENCOUNTER SYMPTOMS
HAIR LOSS: 0
WEIGHT LOSS: 0
PANIC: 0
PALPITATIONS: 0
HOARSE VOICE: 0
INSOMNIA: 1
WEIGHT GAIN: 0
RESTLESSNESS: 0
DEPRESSED MOOD: 0
SHORTNESS OF BREATH: 0
NERVOUS/ANXIOUS: 1
FATIGUE: 0

## 2025-08-08 ASSESSMENT — PAIN SCALES - GENERAL: PAINLEVEL_OUTOF10: 0-NO PAIN

## 2025-08-08 NOTE — PROGRESS NOTES
[facility] at [address]  [place] [address]  [phone]  Fax [phone]      Patient ID: Maryjo Tovar                              : 1970    Visit Date: 2025    Chief Complaint: Follow-up         Patient ID: Maryjo Tovar is a 55 y.o. female.    Patient Active Problem List   Diagnosis    Allergic rhinitis    Anxiety    Atopic dermatitis    Family history of coronary artery disease    Hemorrhoids, external    Primary hypothyroidism    Insomnia due to medical condition    Polycystic ovarian disease    Vitamin D deficiency    Hypercholesterolemia    Colon cancer screening    Overweight with body mass index (BMI) of 29 to 29.9 in adult    Symptomatic menopausal or female climacteric states    Pain in both feet    Primary osteoarthritis of both feet    Hair loss    Acne vulgaris         Current Outpatient Medications:     ALPRAZolam (XANAX) 0.25 mg tablet, Take 1 tablet (0.25 mg total) by mouth 2 (two) times a day as needed for anxiety., Disp: 60 tablet, Rfl: 0    fluticasone propionate (FLONASE) 50 mcg/actuation nasal spray, Administer 1 spray into each nostril daily., Disp: 16 g, Rfl: 5    meloxicam (MOBIC) 15 mg tablet, TAKE 1 TABLET BY MOUTH EVERY DAY, Disp: 30 tablet, Rfl: 0    progesterone (PROMETRIUM) 100 mg capsule, daily., Disp: , Rfl:     SYNTHROID 125 mcg tablet, Take 1 tablet (125 mcg total) by mouth daily., Disp: 90 tablet, Rfl: 3    tretinoin (RETIN-A) 0.05 % cream, Apply topically nightly., Disp: 45 g, Rfl: 1    estradioL 10 mcg tablet, Insert 1 tablet (10 mcg total) into the vagina 2 (two) times a week (Mon, Thu)., Disp: 24 tablet, Rfl: 1    Allergies   Allergen Reactions    Sulfa (Sulfonamide Antibiotics) Hives       Social History     Tobacco Use    Smoking status: Never    Smokeless tobacco: Never   Vaping Use    Vaping status: Never Used   Substance Use Topics    Alcohol use: Yes    Drug use: Never       Health  Maintenance   Topic Date Due    Colorectal Cancer Screening  03/11/2025    Breast Cancer Screening  05/20/2025    Influenza Vaccine (1) 08/01/2025    Zoster Vaccine (1 of 2) 08/08/2026 (Originally 5/13/2020)    Pneumococcal (50 years of age and older) (1 of 1 - PCV) 08/08/2026 (Originally 5/13/2020)    COVID-19 Vaccine (2 - 2024-25 season) 08/08/2026 (Originally 9/1/2024)    Depression Screening  12/18/2025    Cervical Cancer Screening  12/02/2026    RSV Vaccine (1 - 1-dose 75+ series) 05/13/2045    Meningococcal ACWY  Aged Out    RSV <20 months  Aged Out    HIB Vaccines  Aged Out    IPV Vaccines  Aged Out    Meningococcal B  Aged Out    HPV Vaccines  Aged Out    DTaP, Tdap, and Td Vaccines  Discontinued    Hepatitis B Vaccines  Discontinued    HIV Screening  Discontinued    Hepatitis C Screening  Discontinued       HPI  Med check  Needs her thyroid managed here now    Anxiety  Presents for follow-up visit. Symptoms include excessive worry, insomnia and nervous/anxious behavior. Patient reports no chest pain, depressed mood, palpitations, panic, restlessness, shortness of breath or suicidal ideas. Symptoms occur occasionally. The severity of symptoms is moderate. The quality of sleep is good.     Compliance with medications: PRN Xanax.   Thyroid Problem  Presents for follow-up (hypothyroidism on Synthroid) visit. Symptoms include anxiety. Patient reports no depressed mood, fatigue, hair loss, hoarse voice, leg swelling, palpitations, weight gain or weight loss. The symptoms have been stable. Her past medical history is significant for hyperlipidemia. There is no history of diabetes.   Hyperlipidemia  This is a chronic problem. The current episode started more than 1 year ago. The problem is uncontrolled. Recent lipid tests were reviewed and are high. Exacerbating diseases include hypothyroidism. She has no history of chronic renal disease, diabetes, liver disease, obesity or nephrotic syndrome. There are no known  "factors aggravating her hyperlipidemia. Pertinent negatives include no chest pain or shortness of breath. Current antihyperlipidemic treatment includes diet change and exercise. There are no compliance problems.        The following have been reviewed and updated as appropriate in this visit:   Allergies  Meds  Problems         Review of System  Review of Systems   Constitutional:  Negative for fatigue, weight gain and weight loss.   HENT:  Negative for hoarse voice.    Respiratory:  Negative for shortness of breath.    Cardiovascular:  Negative for chest pain and palpitations.   Psychiatric/Behavioral:  Negative for suicidal ideas. The patient is nervous/anxious and has insomnia.        Objective     Vitals  Vitals:    08/08/25 1143   BP: 120/82   BP Location: Left upper arm   Patient Position: Sitting   Pulse: 76   Temp: 36.5 °C (97.7 °F)   TempSrc: Temporal   SpO2: 97%   Weight: 72.6 kg (160 lb)   Height: 1.6 m (5' 2.99\")     Body mass index is 28.35 kg/m².      Physical Exam  Vitals reviewed.   Constitutional:       General: She is not in acute distress.     Appearance: Normal appearance. She is not ill-appearing, toxic-appearing or diaphoretic.   Neck:      Thyroid: No thyroid mass, thyromegaly or thyroid tenderness.   Cardiovascular:      Rate and Rhythm: Normal rate and regular rhythm.      Heart sounds: No murmur heard.     No friction rub. No gallop.   Pulmonary:      Effort: Pulmonary effort is normal.      Breath sounds: Normal breath sounds. No wheezing, rhonchi or rales.   Musculoskeletal:      Cervical back: Neck supple. No rigidity or tenderness.   Lymphadenopathy:      Cervical: No cervical adenopathy.   Neurological:      Mental Status: She is alert and oriented to person, place, and time.   Psychiatric:         Mood and Affect: Mood and affect normal.         Speech: Speech normal.         Behavior: Behavior normal.         Thought Content: Thought content does not include suicidal ideation. " Thought content does not include suicidal plan.         Assessment/Plan     Problem List Items Addressed This Visit       Anxiety - Primary    Xanax PRN  Uses sparingly         Primary hypothyroidism     Latest Reference Range & Units 12/30/24 11:15   TSH 0.450 - 4.500 uIU/mL 1.720     Will take over managing thyroid  Rx sent to mail away pharmacy         Relevant Medications    SYNTHROID 125 mcg tablet    Other Relevant Orders    TSH 3rd Generation    Vitamin D deficiency    Check D level.         Relevant Orders    Vitamin D 25 hydroxy    Hypercholesterolemia     Latest Reference Range & Units 12/30/24 11:15   Sodium 134 - 144 mmol/L 142   Potassium, Bld 3.5 - 5.2 mmol/L 4.3   Chloride 96 - 106 mmol/L 103   CO2 20 - 29 mmol/L 24   BUN 6 - 24 mg/dL 12   Creatinine 0.57 - 1.00 mg/dL 0.75   Glucose 70 - 99 mg/dL 101 (H)   Calcium 8.7 - 10.2 mg/dL 9.8   AST (SGOT) 0 - 40 IU/L 13   ALT (SGPT) 0 - 32 IU/L 13   Alkaline Phosphatase 44 - 121 IU/L 89   Total Protein 6.0 - 8.5 g/dL 6.6   Albumin 3.8 - 4.9 g/dL 4.3   Bilirubin, Total 0.0 - 1.2 mg/dL 0.6   eGFR >59 mL/min/1.73 95   Bun/Creatinine Ratio 9 - 23  16   Globulin 1.5 - 4.5 g/dL 2.3   Cholesterol 100 - 199 mg/dL 254 (H)   Triglycerides 0 - 149 mg/dL 82   HDL >39 mg/dL 84   LDL Calculated 0 - 99 mg/dL 156 (H)   (H): Data is abnormally high    Check CT calcium score for screening         Relevant Orders    CBC and Differential    Comprehensive metabolic panel    Lipid panel    CT HEART CORONARY ARTERY CALCIUM SCORE WITHOUT IV CONTRAST    Colon cancer screening    Colonoscopy ordered         Relevant Orders    Direct Access Colonoscopy [facility]    Symptomatic menopausal or female climacteric states    GYN starting pt on HRT  Get mammogram done         Acne vulgaris    Increase Tretinoin to 0.050% HS         Relevant Medications    tretinoin (RETIN-A) 0.05 % cream     Other Visit Diagnoses         Breast cancer screening by mammogram        Relevant Orders    BI  SCREENING MAMMOGRAM BILATERAL(TOMOSYNTHESIS)                LUIS CARLOS Staley  8/8/2025

## 2025-08-08 NOTE — ASSESSMENT & PLAN NOTE
Latest Reference Range & Units 12/30/24 11:15   TSH 0.450 - 4.500 uIU/mL 1.720     Will take over managing thyroid  Rx sent to mail away pharmacy

## 2025-08-08 NOTE — ASSESSMENT & PLAN NOTE
Latest Reference Range & Units 12/30/24 11:15   Sodium 134 - 144 mmol/L 142   Potassium, Bld 3.5 - 5.2 mmol/L 4.3   Chloride 96 - 106 mmol/L 103   CO2 20 - 29 mmol/L 24   BUN 6 - 24 mg/dL 12   Creatinine 0.57 - 1.00 mg/dL 0.75   Glucose 70 - 99 mg/dL 101 (H)   Calcium 8.7 - 10.2 mg/dL 9.8   AST (SGOT) 0 - 40 IU/L 13   ALT (SGPT) 0 - 32 IU/L 13   Alkaline Phosphatase 44 - 121 IU/L 89   Total Protein 6.0 - 8.5 g/dL 6.6   Albumin 3.8 - 4.9 g/dL 4.3   Bilirubin, Total 0.0 - 1.2 mg/dL 0.6   eGFR >59 mL/min/1.73 95   Bun/Creatinine Ratio 9 - 23  16   Globulin 1.5 - 4.5 g/dL 2.3   Cholesterol 100 - 199 mg/dL 254 (H)   Triglycerides 0 - 149 mg/dL 82   HDL >39 mg/dL 84   LDL Calculated 0 - 99 mg/dL 156 (H)   (H): Data is abnormally high    Check CT calcium score for screening

## 2025-08-25 ENCOUNTER — HOSPITAL ENCOUNTER (OUTPATIENT)
Dept: RADIOLOGY | Age: 55
Discharge: HOME | End: 2025-08-25
Attending: NURSE PRACTITIONER

## 2025-08-25 DIAGNOSIS — E78.00 HYPERCHOLESTEROLEMIA: ICD-10-CM

## 2025-08-25 PROCEDURE — 75571 CT HRT W/O DYE W/CA TEST: CPT
